# Patient Record
Sex: FEMALE | Race: BLACK OR AFRICAN AMERICAN | NOT HISPANIC OR LATINO | Employment: FULL TIME | URBAN - METROPOLITAN AREA
[De-identification: names, ages, dates, MRNs, and addresses within clinical notes are randomized per-mention and may not be internally consistent; named-entity substitution may affect disease eponyms.]

---

## 2023-10-18 ENCOUNTER — APPOINTMENT (OUTPATIENT)
Dept: PRIMARY CARE | Facility: CLINIC | Age: 33
End: 2023-10-18
Payer: COMMERCIAL

## 2024-02-01 ENCOUNTER — OFFICE VISIT (OUTPATIENT)
Dept: OBSTETRICS AND GYNECOLOGY | Facility: CLINIC | Age: 34
End: 2024-02-01
Payer: COMMERCIAL

## 2024-02-01 VITALS
SYSTOLIC BLOOD PRESSURE: 119 MMHG | HEART RATE: 79 BPM | WEIGHT: 163 LBS | DIASTOLIC BLOOD PRESSURE: 75 MMHG | HEIGHT: 68 IN | BODY MASS INDEX: 24.71 KG/M2

## 2024-02-01 DIAGNOSIS — N76.0 BACTERIAL VAGINOSIS: ICD-10-CM

## 2024-02-01 DIAGNOSIS — B96.89 BACTERIAL VAGINOSIS: ICD-10-CM

## 2024-02-01 DIAGNOSIS — L02.212 CUTANEOUS ABSCESS OF BACK EXCLUDING BUTTOCKS: ICD-10-CM

## 2024-02-01 DIAGNOSIS — Z30.431 ENCOUNTER FOR ROUTINE CHECKING OF INTRAUTERINE CONTRACEPTIVE DEVICE (IUD): ICD-10-CM

## 2024-02-01 DIAGNOSIS — Z01.419 WELL WOMAN EXAM: Primary | ICD-10-CM

## 2024-02-01 DIAGNOSIS — Z11.3 SCREENING FOR STD (SEXUALLY TRANSMITTED DISEASE): ICD-10-CM

## 2024-02-01 PROCEDURE — 88175 CYTOPATH C/V AUTO FLUID REDO: CPT

## 2024-02-01 PROCEDURE — 87661 TRICHOMONAS VAGINALIS AMPLIF: CPT

## 2024-02-01 PROCEDURE — 99385 PREV VISIT NEW AGE 18-39: CPT | Performed by: NURSE PRACTITIONER

## 2024-02-01 PROCEDURE — 1036F TOBACCO NON-USER: CPT | Performed by: NURSE PRACTITIONER

## 2024-02-01 PROCEDURE — 87205 SMEAR GRAM STAIN: CPT

## 2024-02-01 PROCEDURE — 87624 HPV HI-RISK TYP POOLED RSLT: CPT

## 2024-02-01 PROCEDURE — 87800 DETECT AGNT MULT DNA DIREC: CPT

## 2024-02-01 RX ORDER — ALBUTEROL SULFATE 90 UG/1
2 AEROSOL, METERED RESPIRATORY (INHALATION)
COMMUNITY
Start: 2022-12-06

## 2024-02-01 SDOH — ECONOMIC STABILITY: FOOD INSECURITY: WITHIN THE PAST 12 MONTHS, THE FOOD YOU BOUGHT JUST DIDN'T LAST AND YOU DIDN'T HAVE MONEY TO GET MORE.: NEVER TRUE

## 2024-02-01 SDOH — ECONOMIC STABILITY: FOOD INSECURITY: WITHIN THE PAST 12 MONTHS, YOU WORRIED THAT YOUR FOOD WOULD RUN OUT BEFORE YOU GOT MONEY TO BUY MORE.: NEVER TRUE

## 2024-02-01 SDOH — ECONOMIC STABILITY: TRANSPORTATION INSECURITY
IN THE PAST 12 MONTHS, HAS LACK OF TRANSPORTATION KEPT YOU FROM MEETINGS, WORK, OR FROM GETTING THINGS NEEDED FOR DAILY LIVING?: NO

## 2024-02-01 SDOH — ECONOMIC STABILITY: TRANSPORTATION INSECURITY
IN THE PAST 12 MONTHS, HAS THE LACK OF TRANSPORTATION KEPT YOU FROM MEDICAL APPOINTMENTS OR FROM GETTING MEDICATIONS?: NO

## 2024-02-01 ASSESSMENT — SOCIAL DETERMINANTS OF HEALTH (SDOH)
WITHIN THE LAST YEAR, HAVE YOU BEEN AFRAID OF YOUR PARTNER OR EX-PARTNER?: NO
WITHIN THE LAST YEAR, HAVE YOU BEEN KICKED, HIT, SLAPPED, OR OTHERWISE PHYSICALLY HURT BY YOUR PARTNER OR EX-PARTNER?: NO
WITHIN THE LAST YEAR, HAVE YOU BEEN HUMILIATED OR EMOTIONALLY ABUSED IN OTHER WAYS BY YOUR PARTNER OR EX-PARTNER?: NO
WITHIN THE LAST YEAR, HAVE TO BEEN RAPED OR FORCED TO HAVE ANY KIND OF SEXUAL ACTIVITY BY YOUR PARTNER OR EX-PARTNER?: NO

## 2024-02-01 ASSESSMENT — ENCOUNTER SYMPTOMS
ALLERGIC/IMMUNOLOGIC NEGATIVE: 1
SORE THROAT: 0
HEADACHES: 0
FEVER: 0
GASTROINTESTINAL NEGATIVE: 1
CARDIOVASCULAR NEGATIVE: 1
FLANK PAIN: 0
HEMATURIA: 0
CONSTITUTIONAL NEGATIVE: 1
FREQUENCY: 1
WOUND: 1
ENDOCRINE NEGATIVE: 1
MUSCULOSKELETAL NEGATIVE: 1
HEMATOLOGIC/LYMPHATIC NEGATIVE: 1
EYES NEGATIVE: 1
NEUROLOGICAL NEGATIVE: 1
PSYCHIATRIC NEGATIVE: 1
CONSTIPATION: 0
BACK PAIN: 0
CHILLS: 0
VOMITING: 0
DIARRHEA: 0
ANOREXIA: 0
DIFFICULTY URINATING: 1
RESPIRATORY NEGATIVE: 1
ABDOMINAL PAIN: 0
DYSURIA: 1
NAUSEA: 0

## 2024-02-01 ASSESSMENT — PATIENT HEALTH QUESTIONNAIRE - PHQ9
1. LITTLE INTEREST OR PLEASURE IN DOING THINGS: NOT AT ALL
2. FEELING DOWN, DEPRESSED OR HOPELESS: NOT AT ALL
SUM OF ALL RESPONSES TO PHQ9 QUESTIONS 1 & 2: 0

## 2024-02-01 ASSESSMENT — PAIN SCALES - GENERAL: PAINLEVEL: 0-NO PAIN

## 2024-02-01 NOTE — PROGRESS NOTES
Althea Vernon, APRN-CNP     Subjective   Ravi Low is a 33 y.o. female who presents for annual exam.   IsPatient ID: Ravi Low is a 33 y.o. female.   new to this practice here for annual exam.  She has an IUD in place as her birth control method that was placed after the birth of her last child.  It is going on year 6.  She is aware that it will be due to be removed after 8 years.    Patient has discomfort after urination, a fishy odor and slight itching.  Urinalysis in office today is unremarkable.    Patient has a lesion on her back along her spinal column that has not drained despite attempt at removing any discharge inside.  She would like a referral to dermatology.    Procedures   Past Medical History:   Diagnosis Date    Acute vaginitis     Bacterial vaginosis    Anogenital (venereal) warts 2016    Genital warts    Encounter for immunization 2018    Need for Tdap vaccination    Other specified health status 2016    No pertinent past medical history     Past Surgical History:   Procedure Laterality Date     SECTION, CLASSIC       Section     SECTION, LOW TRANSVERSE      WISDOM TOOTH EXTRACTION         OB History          2    Para   2    Term                AB        Living   2         SAB        IAB        Ectopic        Multiple        Live Births   2               Patient's last menstrual period was 2024.      Review of Systems   Constitutional: Negative.    HENT: Negative.     Eyes: Negative.    Respiratory: Negative.     Cardiovascular: Negative.    Gastrointestinal: Negative.    Endocrine: Negative.    Genitourinary:  Positive for difficulty urinating, frequency and vaginal discharge.   Musculoskeletal: Negative.    Skin:  Positive for wound.   Allergic/Immunologic: Negative.    Neurological: Negative.    Hematological: Negative.    Psychiatric/Behavioral: Negative.     All other systems reviewed and are negative.    Breast: No  "Complaints   Vaginal: No Complaints        Objective   /75 (BP Location: Left arm, Patient Position: Sitting, BP Cuff Size: Adult)   Pulse 79   Ht 1.727 m (5' 8\")   Wt 73.9 kg (163 lb)   LMP 01/25/2024   BMI 24.78 kg/m²   Physical Exam  Constitutional:       Appearance: Normal appearance.   Genitourinary:      Vulva and rectum normal.      Right Labia: No rash or tenderness.     Left Labia: No tenderness or rash.     Cervical motion tenderness present.      Uterus is fixed.   Cardiovascular:      Rate and Rhythm: Normal rate.   Pulmonary:      Effort: Pulmonary effort is normal.      Breath sounds: Normal breath sounds.   Musculoskeletal:         General: Normal range of motion.   Neurological:      General: No focal deficit present.      Mental Status: She is alert.   Skin:     General: Skin is warm and dry.      Findings: Lesion present.   Vitals and nursing note reviewed.     Lesion present midline upper back slightly tender no drainage noted            Assessment/Plan   Problem List Items Addressed This Visit    None  Visit Diagnoses         Codes    Well woman exam    -  Primary Z01.419    Relevant Orders    THINPREP PAP TEST    Encounter for routine checking of intrauterine contraceptive device (IUD)     Z30.431    Cutaneous abscess of back excluding buttocks     L02.212    Relevant Orders    Referral to Dermatology    Screening for STD (sexually transmitted disease)     Z11.3    Relevant Orders    HIV 1/2 Antigen/Antibody Screen with Reflex to Confirmation    Hepatitis B Surface Antigen    Hepatitis C Antibody    Syphilis Screen with Reflex    Vaginitis Gram Stain For Bacterial Vaginosis + Yeast          "

## 2024-02-02 LAB
CLUE CELLS VAG LPF-#/AREA: PRESENT /[LPF]
NUGENT SCORE: 9
YEAST VAG WET PREP-#/AREA: ABNORMAL

## 2024-02-02 RX ORDER — METRONIDAZOLE 500 MG/1
500 TABLET ORAL 2 TIMES DAILY
Qty: 14 TABLET | Refills: 0 | Status: SHIPPED | OUTPATIENT
Start: 2024-02-02 | End: 2024-02-09

## 2024-02-03 LAB
C TRACH RRNA SPEC QL NAA+PROBE: NEGATIVE
N GONORRHOEA DNA SPEC QL PROBE+SIG AMP: NEGATIVE
T VAGINALIS RRNA SPEC QL NAA+PROBE: NEGATIVE

## 2024-02-14 ENCOUNTER — OFFICE VISIT (OUTPATIENT)
Dept: DERMATOLOGY | Facility: CLINIC | Age: 34
End: 2024-02-14
Payer: COMMERCIAL

## 2024-02-14 DIAGNOSIS — L72.0 EPIDERMAL CYST: Primary | ICD-10-CM

## 2024-02-14 PROCEDURE — 99203 OFFICE O/P NEW LOW 30 MIN: CPT | Performed by: NURSE PRACTITIONER

## 2024-02-14 PROCEDURE — 1036F TOBACCO NON-USER: CPT | Performed by: NURSE PRACTITIONER

## 2024-02-14 NOTE — PROGRESS NOTES
Subjective     Ravi Low is a 33 y.o. female who presents for the following: Suspicious Skin Lesion (Pt presents for examination of lesion to back. She states it has been there for approximately one year and it is itchy and painful. Denies personal or family history of skin cancer. ).     Review of Systems:  No other skin or systemic complaints other than what is documented elsewhere in the note.    The following portions of the chart were reviewed this encounter and updated as appropriate:  Tobacco  Allergies  Meds  Problems  Med Hx  Surg Hx  Fam Hx         Skin Cancer History  No skin cancer on file.      Specialty Problems    None       Objective   Well appearing patient in no apparent distress; mood and affect are within normal limits.    A full examination was performed including scalp, head, eyes, ears, nose, lips, neck, chest, axillae, abdomen, back, buttocks, bilateral upper extremities, bilateral lower extremities, hands, feet, fingers, toes, fingernails, and toenails. All findings within normal limits unless otherwise noted below.    Assessment/Plan   1. Epidermal cyst  Mid Back  1.5cm subcutaneous nodule with normal-appearing overlying skin and connecting pore    -Discussed nature of the condition  -Reassurance, recommend observation at this time  - Due to pain, itch, will submit prior authorization for excision.

## 2024-02-15 LAB
CYTOLOGY CMNT CVX/VAG CYTO-IMP: NORMAL
HPV HR 12 DNA GENITAL QL NAA+PROBE: NEGATIVE
HPV HR GENOTYPES PNL CVX NAA+PROBE: NEGATIVE
HPV16 DNA SPEC QL NAA+PROBE: NEGATIVE
HPV18 DNA SPEC QL NAA+PROBE: NEGATIVE
LAB AP CONTRACEPTIVE HISTORY: NORMAL
LAB AP HPV GENOTYPE QUESTION: YES
LAB AP HPV HR: NORMAL
LAB AP PAP ADDITIONAL TESTS: NORMAL
LABORATORY COMMENT REPORT: NORMAL
LMP START DATE: NORMAL
PATH REPORT.TOTAL CANCER: NORMAL

## 2024-05-13 ENCOUNTER — PROCEDURE VISIT (OUTPATIENT)
Dept: DERMATOLOGY | Facility: CLINIC | Age: 34
End: 2024-05-13
Payer: COMMERCIAL

## 2024-05-13 DIAGNOSIS — D48.5 NEOPLASM OF UNCERTAIN BEHAVIOR OF SKIN: ICD-10-CM

## 2024-05-13 PROCEDURE — 12032 INTMD RPR S/A/T/EXT 2.6-7.5: CPT | Performed by: STUDENT IN AN ORGANIZED HEALTH CARE EDUCATION/TRAINING PROGRAM

## 2024-05-13 PROCEDURE — 11402 EXC TR-EXT B9+MARG 1.1-2 CM: CPT | Performed by: STUDENT IN AN ORGANIZED HEALTH CARE EDUCATION/TRAINING PROGRAM

## 2024-05-13 NOTE — PROGRESS NOTES
Excision Operative Note    Date of Surgery:  5/13/2024  Surgeon:  Ash Block DO  Office Location: DO [address]  [address]  [facility] [address]  Dept: [phone]  Dept Fax: [phone]  Referring Provider: Susan L Mayne, APRN-CNP  [address]  [place] [address]    Subjective   Ravi Low is a 33 y.o. female who presents for the following: Excision for neoplasm of uncertain behavior of skin.    According to the patient, the lesion has been present for approximately 6 months at the time of diagnosis.  The lesion is growing, itch and pain.  The lesion has not been treated previously.    The patient does not have a pacemaker / defibrillator.  The patient does not have a heart valve / joint replacement.    The patient is not on blood thinners.   The patient does not have a history of hepatitis B or C.  The patient does not have a history of HIV.  The patient does not have a history of immunosuppression (e.g. organ transplantation, malignancy, medications)    Is it okay to leave a phone message with results? Yes  Who else may we leave results with: (name, relationship) No    The following portions of the chart were reviewed this encounter and updated as appropriate:         Assessment/Plan   Pre-procedure:   Obtained informed consent: written from patient  The surgical site was identified and confirmed with the patient.     Intra-operative:   Audible time out called at : 0915 AM 05/13/24  by: Nena Abreu MA   Verified patient name, birthdate, site, specimen bottle label & requisition.    The planned procedure(s) was again reviewed with the patient. The risks of bleeding, infection, nerve damage and scarring were reviewed. The patient identity, surgical site, and planned procedure(s) were verified.       Neoplasm of uncertain behavior of skin  Mid Back    Skin excision    Lesion length (cm):  1.1  Lesion width (cm):  1.1  Margin per side (cm):   0  Total excision diameter (cm):  1.1  Informed consent: discussed and consent obtained    Timeout: patient name, date of birth, surgical site, and procedure verified    Procedure prep:  Patient prepped in sterile fashion  Anesthesia: the lesion was anesthetized in a standard fashion    Anesthetic:  1% lidocaine w/ epinephrine 1-100,000 local infiltration  Instrument used: #15 blade    Hemostasis achieved with: suture, pressure and electrodesiccation    Outcome: patient tolerated procedure well with no complications    Post-procedure details: sterile dressing applied and wound care instructions given    Dressing type: pressure dressing    Additional details:  The possible diagnoses were explained. Although the lesion is likely benign, the patient requests removal of the lesion because of the symptoms it is causing. Excision was discussed with the patient. The risks, benefits and potential adverse effects were reviewed. Discussion included but was not limited to the cure rate, relative cost, wound care requirements, activity restrictions, likely scar outcome and time to heal were reviewed. Alternative options including monitoring the lesion were discussed. The patient elected to proceed with excision.     Skin repair  Complexity:  Intermediate  Final length (cm):  3.7  Informed consent: discussed and consent obtained    Timeout: patient name, date of birth, surgical site, and procedure verified    Procedure prep:  Patient prepped in sterile fashion  Anesthesia: the lesion was anesthetized in a standard fashion    Reason for type of repair: reduce tension to allow closure and preserve normal anatomical and functional relationships    Undermining: edges undermined    Subcutaneous layers (deep stitches):   Suture size:  3-0  Suture type: Vicryl (polyglactin 910)    Stitches:  Buried vertical mattress  Fine/surface layer approximation (top stitches):   Suture size:  5-0  Suture type: Prolene (polypropylene)    Stitches:  simple running    Suture removal (days):  10  Hemostasis achieved with: suture, pressure and electrodesiccation  Outcome: patient tolerated procedure well with no complications    Post-procedure details: sterile dressing applied and wound care instructions given    Dressing type: pressure dressing      Specimen 1 - Dermatopathology- DERM LAB  Differential Diagnosis: NUB  Check Margins Yes/No?:  NO  Comments:    Dermpath Lab: Routine Histopathology (formalin-fixed tissue)      Intermediate Linear Repair:  Given the location and size of the defect, it was determined that an intermediate layered linear closure was required to restore normal anatomy and function. The repair is an intermediate closure as two layers of sutures were required. The defect was undermined extensively at the level of the subcutaneous plane. Standing cutaneous cones were removed using Burow's triangles. The wound edges were brought into close approximation with buried vertical mattress sutures. The remainder of the wound was then closed with epidermal top sutures.    The final repair measured 3.7 cm    Wound care was discussed, and the patient was given written post-operative wound care instructions.      The patient will follow up with Ash Block DO as needed for any post operative problems or concerns, and will follow up with their primary dermatologist as scheduled.

## 2024-05-15 LAB
LABORATORY COMMENT REPORT: NORMAL
PATH REPORT.FINAL DX SPEC: NORMAL
PATH REPORT.GROSS SPEC: NORMAL
PATH REPORT.MICROSCOPIC SPEC OTHER STN: NORMAL
PATH REPORT.RELEVANT HX SPEC: NORMAL
PATH REPORT.TOTAL CANCER: NORMAL

## 2024-05-22 ENCOUNTER — TELEPHONE (OUTPATIENT)
Dept: DERMATOLOGY | Facility: CLINIC | Age: 34
End: 2024-05-22
Payer: COMMERCIAL

## 2024-05-22 NOTE — TELEPHONE ENCOUNTER
Patient notified that lesion removed is a benign Dermatofibroma and no further treatment is needed.

## 2024-05-23 ENCOUNTER — OFFICE VISIT (OUTPATIENT)
Dept: DERMATOLOGY | Facility: CLINIC | Age: 34
End: 2024-05-23
Payer: COMMERCIAL

## 2024-05-23 DIAGNOSIS — Z51.89 VISIT FOR WOUND CHECK: ICD-10-CM

## 2024-05-23 PROCEDURE — 99024 POSTOP FOLLOW-UP VISIT: CPT | Performed by: DERMATOLOGY

## 2024-07-16 ENCOUNTER — HOSPITAL ENCOUNTER (EMERGENCY)
Facility: HOSPITAL | Age: 34
Discharge: HOME | End: 2024-07-16
Attending: EMERGENCY MEDICINE
Payer: COMMERCIAL

## 2024-07-16 ENCOUNTER — APPOINTMENT (OUTPATIENT)
Dept: RADIOLOGY | Facility: HOSPITAL | Age: 34
End: 2024-07-16
Payer: COMMERCIAL

## 2024-07-16 VITALS
HEART RATE: 89 BPM | HEIGHT: 68 IN | RESPIRATION RATE: 16 BRPM | TEMPERATURE: 98.6 F | SYSTOLIC BLOOD PRESSURE: 105 MMHG | WEIGHT: 162 LBS | DIASTOLIC BLOOD PRESSURE: 83 MMHG | BODY MASS INDEX: 24.55 KG/M2 | OXYGEN SATURATION: 95 %

## 2024-07-16 DIAGNOSIS — N20.0 NEPHROLITHIASIS: ICD-10-CM

## 2024-07-16 DIAGNOSIS — N76.0 BACTERIAL VAGINITIS: ICD-10-CM

## 2024-07-16 DIAGNOSIS — B96.89 BACTERIAL VAGINITIS: ICD-10-CM

## 2024-07-16 DIAGNOSIS — R10.84 ABDOMINAL PAIN, GENERALIZED: Primary | ICD-10-CM

## 2024-07-16 DIAGNOSIS — R11.2 NAUSEA AND VOMITING, UNSPECIFIED VOMITING TYPE: ICD-10-CM

## 2024-07-16 LAB
ALBUMIN SERPL BCP-MCNC: 3.8 G/DL (ref 3.4–5)
ALP SERPL-CCNC: 78 U/L (ref 33–110)
ALT SERPL W P-5'-P-CCNC: 17 U/L (ref 7–45)
ANION GAP SERPL CALC-SCNC: 10 MMOL/L (ref 10–20)
APPEARANCE UR: ABNORMAL
AST SERPL W P-5'-P-CCNC: 19 U/L (ref 9–39)
BASOPHILS # BLD AUTO: 0.02 X10*3/UL (ref 0–0.1)
BASOPHILS NFR BLD AUTO: 0.2 %
BILIRUB SERPL-MCNC: 0.4 MG/DL (ref 0–1.2)
BILIRUB UR STRIP.AUTO-MCNC: NEGATIVE MG/DL
BUN SERPL-MCNC: 8 MG/DL (ref 6–23)
CALCIUM SERPL-MCNC: 8.9 MG/DL (ref 8.6–10.3)
CHLORIDE SERPL-SCNC: 105 MMOL/L (ref 98–107)
CLUE CELLS SPEC QL WET PREP: PRESENT
CO2 SERPL-SCNC: 25 MMOL/L (ref 21–32)
COLOR UR: ABNORMAL
CREAT SERPL-MCNC: 0.65 MG/DL (ref 0.5–1.05)
EGFRCR SERPLBLD CKD-EPI 2021: >90 ML/MIN/1.73M*2
EOSINOPHIL # BLD AUTO: 0.2 X10*3/UL (ref 0–0.7)
EOSINOPHIL NFR BLD AUTO: 2.3 %
ERYTHROCYTE [DISTWIDTH] IN BLOOD BY AUTOMATED COUNT: 14.8 % (ref 11.5–14.5)
GLUCOSE SERPL-MCNC: 92 MG/DL (ref 74–99)
GLUCOSE UR STRIP.AUTO-MCNC: NORMAL MG/DL
HCG UR QL IA.RAPID: NEGATIVE
HCT VFR BLD AUTO: 35.1 % (ref 36–46)
HGB BLD-MCNC: 11.5 G/DL (ref 12–16)
HIV 1+2 AB+HIV1 P24 AG SERPL QL IA: NONREACTIVE
IMM GRANULOCYTES # BLD AUTO: 0.02 X10*3/UL (ref 0–0.7)
IMM GRANULOCYTES NFR BLD AUTO: 0.2 % (ref 0–0.9)
KETONES UR STRIP.AUTO-MCNC: NEGATIVE MG/DL
LACTATE SERPL-SCNC: 0.5 MMOL/L (ref 0.4–2)
LEUKOCYTE ESTERASE UR QL STRIP.AUTO: NEGATIVE
LYMPHOCYTES # BLD AUTO: 3.02 X10*3/UL (ref 1.2–4.8)
LYMPHOCYTES NFR BLD AUTO: 34.7 %
MAGNESIUM SERPL-MCNC: 1.7 MG/DL (ref 1.6–2.4)
MCH RBC QN AUTO: 26.7 PG (ref 26–34)
MCHC RBC AUTO-ENTMCNC: 32.8 G/DL (ref 32–36)
MCV RBC AUTO: 81 FL (ref 80–100)
MONOCYTES # BLD AUTO: 0.33 X10*3/UL (ref 0.1–1)
MONOCYTES NFR BLD AUTO: 3.8 %
NEUTROPHILS # BLD AUTO: 5.12 X10*3/UL (ref 1.2–7.7)
NEUTROPHILS NFR BLD AUTO: 58.8 %
NITRITE UR QL STRIP.AUTO: NEGATIVE
NRBC BLD-RTO: 0 /100 WBCS (ref 0–0)
PH UR STRIP.AUTO: 6.5 [PH]
PLATELET # BLD AUTO: 391 X10*3/UL (ref 150–450)
POTASSIUM SERPL-SCNC: 3.5 MMOL/L (ref 3.5–5.3)
PROT SERPL-MCNC: 7.5 G/DL (ref 6.4–8.2)
PROT UR STRIP.AUTO-MCNC: NEGATIVE MG/DL
RBC # BLD AUTO: 4.31 X10*6/UL (ref 4–5.2)
RBC # UR STRIP.AUTO: ABNORMAL /UL
RBC #/AREA URNS AUTO: NORMAL /HPF
SODIUM SERPL-SCNC: 136 MMOL/L (ref 136–145)
SP GR UR STRIP.AUTO: 1.01
SQUAMOUS #/AREA URNS AUTO: NORMAL /HPF
T VAGINALIS SPEC QL WET PREP: ABNORMAL
TRICHOMONAS REFLEX COMMENT: ABNORMAL
UROBILINOGEN UR STRIP.AUTO-MCNC: NORMAL MG/DL
WBC # BLD AUTO: 8.7 X10*3/UL (ref 4.4–11.3)
WBC #/AREA URNS AUTO: NORMAL /HPF
WBC VAG QL WET PREP: ABNORMAL
YEAST VAG QL WET PREP: ABNORMAL

## 2024-07-16 PROCEDURE — 85025 COMPLETE CBC W/AUTO DIFF WBC: CPT

## 2024-07-16 PROCEDURE — 81001 URINALYSIS AUTO W/SCOPE: CPT

## 2024-07-16 PROCEDURE — 76830 TRANSVAGINAL US NON-OB: CPT

## 2024-07-16 PROCEDURE — 74177 CT ABD & PELVIS W/CONTRAST: CPT | Performed by: RADIOLOGY

## 2024-07-16 PROCEDURE — 96361 HYDRATE IV INFUSION ADD-ON: CPT

## 2024-07-16 PROCEDURE — 99285 EMERGENCY DEPT VISIT HI MDM: CPT | Mod: 25

## 2024-07-16 PROCEDURE — 36415 COLL VENOUS BLD VENIPUNCTURE: CPT

## 2024-07-16 PROCEDURE — 87205 SMEAR GRAM STAIN: CPT | Mod: AHULAB

## 2024-07-16 PROCEDURE — 2550000001 HC RX 255 CONTRASTS: Performed by: EMERGENCY MEDICINE

## 2024-07-16 PROCEDURE — 2500000004 HC RX 250 GENERAL PHARMACY W/ HCPCS (ALT 636 FOR OP/ED)

## 2024-07-16 PROCEDURE — 96376 TX/PRO/DX INJ SAME DRUG ADON: CPT

## 2024-07-16 PROCEDURE — 87210 SMEAR WET MOUNT SALINE/INK: CPT

## 2024-07-16 PROCEDURE — 87491 CHLMYD TRACH DNA AMP PROBE: CPT | Mod: AHULAB

## 2024-07-16 PROCEDURE — 81025 URINE PREGNANCY TEST: CPT | Performed by: EMERGENCY MEDICINE

## 2024-07-16 PROCEDURE — 84075 ASSAY ALKALINE PHOSPHATASE: CPT

## 2024-07-16 PROCEDURE — 83735 ASSAY OF MAGNESIUM: CPT

## 2024-07-16 PROCEDURE — 96375 TX/PRO/DX INJ NEW DRUG ADDON: CPT

## 2024-07-16 PROCEDURE — 2500000001 HC RX 250 WO HCPCS SELF ADMINISTERED DRUGS (ALT 637 FOR MEDICARE OP)

## 2024-07-16 PROCEDURE — 83605 ASSAY OF LACTIC ACID: CPT

## 2024-07-16 PROCEDURE — 93975 VASCULAR STUDY: CPT

## 2024-07-16 PROCEDURE — 87389 HIV-1 AG W/HIV-1&-2 AB AG IA: CPT | Mod: AHULAB

## 2024-07-16 PROCEDURE — 87661 TRICHOMONAS VAGINALIS AMPLIF: CPT | Mod: AHULAB

## 2024-07-16 PROCEDURE — 96374 THER/PROPH/DIAG INJ IV PUSH: CPT | Mod: 59

## 2024-07-16 PROCEDURE — 2500000002 HC RX 250 W HCPCS SELF ADMINISTERED DRUGS (ALT 637 FOR MEDICARE OP, ALT 636 FOR OP/ED)

## 2024-07-16 PROCEDURE — 74177 CT ABD & PELVIS W/CONTRAST: CPT

## 2024-07-16 RX ORDER — NAPROXEN 500 MG/1
500 TABLET ORAL
Qty: 30 TABLET | Refills: 0 | Status: SHIPPED | OUTPATIENT
Start: 2024-07-16 | End: 2024-07-31

## 2024-07-16 RX ORDER — ONDANSETRON HYDROCHLORIDE 2 MG/ML
4 INJECTION, SOLUTION INTRAVENOUS ONCE
Status: COMPLETED | OUTPATIENT
Start: 2024-07-16 | End: 2024-07-16

## 2024-07-16 RX ORDER — ACETAMINOPHEN 500 MG
1000 TABLET ORAL EVERY 6 HOURS PRN
Qty: 30 TABLET | Refills: 0 | Status: SHIPPED | OUTPATIENT
Start: 2024-07-16 | End: 2024-07-26

## 2024-07-16 RX ORDER — OXYCODONE HYDROCHLORIDE 5 MG/1
5 TABLET ORAL ONCE
Status: COMPLETED | OUTPATIENT
Start: 2024-07-16 | End: 2024-07-16

## 2024-07-16 RX ORDER — ONDANSETRON 4 MG/1
4 TABLET, FILM COATED ORAL EVERY 6 HOURS
Qty: 12 TABLET | Refills: 0 | Status: SHIPPED | OUTPATIENT
Start: 2024-07-16 | End: 2024-07-19

## 2024-07-16 RX ORDER — TAMSULOSIN HYDROCHLORIDE 0.4 MG/1
0.4 CAPSULE ORAL DAILY
Qty: 3 CAPSULE | Refills: 0 | Status: SHIPPED | OUTPATIENT
Start: 2024-07-16

## 2024-07-16 RX ORDER — TAMSULOSIN HYDROCHLORIDE 0.4 MG/1
0.4 CAPSULE ORAL ONCE
Status: COMPLETED | OUTPATIENT
Start: 2024-07-16 | End: 2024-07-16

## 2024-07-16 RX ORDER — METRONIDAZOLE 500 MG/1
500 TABLET ORAL ONCE
Status: COMPLETED | OUTPATIENT
Start: 2024-07-16 | End: 2024-07-16

## 2024-07-16 RX ORDER — KETOROLAC TROMETHAMINE 30 MG/ML
15 INJECTION, SOLUTION INTRAMUSCULAR; INTRAVENOUS ONCE
Status: COMPLETED | OUTPATIENT
Start: 2024-07-16 | End: 2024-07-16

## 2024-07-16 RX ORDER — MORPHINE SULFATE 2 MG/ML
2 INJECTION, SOLUTION INTRAMUSCULAR; INTRAVENOUS ONCE
Status: COMPLETED | OUTPATIENT
Start: 2024-07-16 | End: 2024-07-16

## 2024-07-16 RX ORDER — ACETAMINOPHEN 325 MG/1
975 TABLET ORAL ONCE
Status: COMPLETED | OUTPATIENT
Start: 2024-07-16 | End: 2024-07-16

## 2024-07-16 RX ORDER — METRONIDAZOLE 500 MG/1
500 TABLET ORAL 3 TIMES DAILY
Qty: 30 TABLET | Refills: 0 | Status: SHIPPED | OUTPATIENT
Start: 2024-07-16 | End: 2024-07-26

## 2024-07-16 ASSESSMENT — PAIN SCALES - GENERAL
PAINLEVEL_OUTOF10: 3
PAINLEVEL_OUTOF10: 2
PAINLEVEL_OUTOF10: 9
PAINLEVEL_OUTOF10: 9

## 2024-07-16 ASSESSMENT — PAIN - FUNCTIONAL ASSESSMENT
PAIN_FUNCTIONAL_ASSESSMENT: 0-10
PAIN_FUNCTIONAL_ASSESSMENT: 0-10

## 2024-07-16 ASSESSMENT — COLUMBIA-SUICIDE SEVERITY RATING SCALE - C-SSRS
1. IN THE PAST MONTH, HAVE YOU WISHED YOU WERE DEAD OR WISHED YOU COULD GO TO SLEEP AND NOT WAKE UP?: NO
6. HAVE YOU EVER DONE ANYTHING, STARTED TO DO ANYTHING, OR PREPARED TO DO ANYTHING TO END YOUR LIFE?: NO
2. HAVE YOU ACTUALLY HAD ANY THOUGHTS OF KILLING YOURSELF?: NO

## 2024-07-16 ASSESSMENT — PAIN DESCRIPTION - DESCRIPTORS: DESCRIPTORS: BURNING

## 2024-07-16 ASSESSMENT — PAIN DESCRIPTION - LOCATION: LOCATION: ABDOMEN

## 2024-07-16 NOTE — DISCHARGE INSTRUCTIONS
Please return to the emergency department, should you have any worsening symptoms, are unable to get an appointment with your primary care physician within the discussed time frame, or have any further concerns.     Please follow up with:   Primary care as needed.  Please follow-up with urology in the next few days.    You may take ibuprofen or tylenol for pain. You may alternate ibuprofen and tylenol every 6 hours for better pain control.    Do not exceed 2400mg of ibuprofen or 4000mg of tylenol in a 24 hour period.

## 2024-07-16 NOTE — ED PROVIDER NOTES
Emergency Department Provider Note        History of Present Illness     History provided by: Patient  Limitations to History: None  External Records Reviewed with Brief Summary: Outpatient progress note from 7/15/2024 from [facility] which showed that she was treated for UTI, patient was able to show me her results, urinalysis was positive for blood, and bacteria, however negative for leukocytes or leukocyte esterase.    HPI:  Ravi Low is a 33 y.o. female with prior history of frequent UTIs over the last few months presented to the emergency department with persistent hematuria and overall malaise with nausea, and vomiting.  Patient states that she has been treated multiple times for UTIs, however they seem to recur shortly after completing her antibiotics.  Patient was recently seen at [facility] yesterday for hematuria, and had a urinalysis performed which was significant for blood and bacteria, however did not show any white blood cells or leukocyte esterase.  Since then she has noted nausea, vomiting, and diffuse abdominal pain.  She declines any vaginal bleeding or vaginal discharge, notes that she is on Mirena so does she does not have regular cycles.  However point-of-care pregnancy test performed at outside hospital yesterday and was noted to be negative.      Physical Exam   Triage vitals:  T 36.3 °C (97.3 °F)  HR (!) 102  /83  RR 17  O2 95 %      GEN:  A&Ox3, in mild acute pain distress, appears uncomfortable. Conversational and appropriate.    HEENT: Normocephalic, atraumatic. Conjunctiva pink with no redness or exudates. Hearing grossly intact. Moist mucous membranes.  CARDIO: Tachycardic rate and regular rhythm. Normal S1, S2  without murmurs, rubs, or gallops.   PULM: Clear to auscultation bilaterally. No rales, rhonchi, or wheezes. No accessory muscle use or stridor.  GI: Soft, diffusely tender abdomen, non-distended. No rebound tenderness or guarding.  Active nausea on physical exam.  SKIN: Warm and  dry, no rashes, lesions, petechiae, or purpura.  MSK: ROM intact in all 4 extremities without contractures or pain. No peripheral edema, contusions, or wounds.    NEURO: No focal findings identified. No confusion or gross mental status changes.    Medical Decision Making & ED Course   Medical Decision Makin y.o. female with prior medical history of frequent UTIs presenting to the emergency department with nausea, vomiting, and diffuse abdominal pain, found to have nephrolithiasis.  Patient still had blood in her urine, which supports this, in addition to CT abdomen/pelvis findings.  Low suspicion for tubo-ovarian abscess or torsion given negative ultrasound of the pelvis.  Normal lactate and normal white blood cell count, thus low suspicion for bacteremia.  However given patient's duration of symptoms, a pelvic was performed to rule out additional STI concerns, and STD testing was sent.  Notable positive test for BV, was given Flagyl in the ED and was able to tolerate appropriately.  Symptoms improved with Zofran, Toradol, and morphine.  Discussed these findings with her, she was agreeable to discharge home and follow-up with urology.  She was given prescriptions for Zofran, naproxen, Tylenol, Flagyl and Flomax.  She was discharged in stable condition.  ----      Differential diagnoses considered include but are not limited to: Nephrolithiasis, pyelonephritis, septic nephrolithiasis, pregnancy+/- ectopic, cholecystitis, pancreatitis, tubo-ovarian abscess, ovarian torsion     Social Determinants of Health which Significantly Impact Care: None identified     EKG Independent Interpretation: EKG not obtained    Independent Result Review and Interpretation: Relevant laboratory and radiographic results were reviewed and independently interpreted by myself.  As necessary, they are commented on in the ED Course.    Chronic conditions affecting the patient's care: As documented above in MDM    The patient was discussed  with the following consultants/services: None    Care Considerations: As documented above in MDM    ED Course:  ED Course as of 07/16/24 1957 Tue Jul 16, 2024   1105 HEMOGLOBIN(!): 11.5  Baseline [AD]   1105 Appearance, Urine(!): Turbid [AD]   1105 Blood, Urine(!): 0.1 (1+) [AD]   1105 HCG, Urine: NEGATIVE [AD]   1105 Lactate: 0.5 [AD]   1200 US pelvis transvaginal  No TOA, or torsion [AD]   1230 CT abdomen pelvis w IV contrast  4mm calcified area near the bladder junction, likely a kidney stone [AD]      ED Course User Index  [AD] Dayron Driscoll DO         Diagnoses as of 07/16/24 1957   Abdominal pain, generalized   Nausea and vomiting, unspecified vomiting type   Nephrolithiasis   Bacterial vaginitis     Disposition   As a result of the work-up, the patient was discharged home.  she was informed of her diagnosis and instructed to come back with any concerns or worsening of condition.  she and was agreeable to the plan as discussed above.  she was given the opportunity to ask questions.  All of the patient's questions were answered.    Procedures   Procedures    Patient seen and discussed with ED attending physician.    Dayron Driscoll DO  Emergency Medicine       Dayron Driscoll DO  Resident  07/16/24 1957

## 2024-07-16 NOTE — ED TRIAGE NOTES
PT is A/Ox4 coming in for dizziness/ABD pain. Pt stated that symptoms started 2 days ago and she was in the ED yesterday and was treated for a UTI. PT stated that her AB D pain is getting worse and today she is dizzy with a headachy. While at work the PT was seeing spots. PT is also experiencing slight shortness of breath.

## 2024-07-16 NOTE — LETTER
July 16, 2024    Patient: Ravi Low   YOB: 1990   Date of Visit: 7/16/2024       To Whom It May Concern:    Ravi Low was seen and treated in our emergency department on 7/16/2024. She nay return to work 7/19/2024    If you have any questions or concerns, please don't hesitate to call.              CC: No Recipients

## 2024-07-17 LAB
BACTERIAL VAGINOSIS VAG-IMP: ABNORMAL
C TRACH RRNA SPEC QL NAA+PROBE: NEGATIVE
CLUE CELLS VAG LPF-#/AREA: PRESENT /[LPF]
N GONORRHOEA DNA SPEC QL PROBE+SIG AMP: NEGATIVE
NUGENT SCORE: 10
T VAGINALIS RRNA SPEC QL NAA+PROBE: NEGATIVE
YEAST VAG WET PREP-#/AREA: ABNORMAL

## 2024-07-22 ENCOUNTER — HOSPITAL ENCOUNTER (EMERGENCY)
Facility: HOSPITAL | Age: 34
Discharge: HOME | End: 2024-07-22
Attending: EMERGENCY MEDICINE
Payer: COMMERCIAL

## 2024-07-22 VITALS
BODY MASS INDEX: 24.55 KG/M2 | TEMPERATURE: 98.3 F | HEIGHT: 68 IN | WEIGHT: 162 LBS | SYSTOLIC BLOOD PRESSURE: 114 MMHG | DIASTOLIC BLOOD PRESSURE: 76 MMHG | HEART RATE: 72 BPM | OXYGEN SATURATION: 100 % | RESPIRATION RATE: 18 BRPM

## 2024-07-22 DIAGNOSIS — R10.2 PELVIC PAIN: ICD-10-CM

## 2024-07-22 DIAGNOSIS — T83.32XA MALPOSITIONED INTRAUTERINE DEVICE (IUD), INITIAL ENCOUNTER: Primary | ICD-10-CM

## 2024-07-22 DIAGNOSIS — N39.0 UTI (URINARY TRACT INFECTION) WITH PYURIA: ICD-10-CM

## 2024-07-22 LAB
ALBUMIN SERPL BCP-MCNC: 3.8 G/DL (ref 3.4–5)
ALP SERPL-CCNC: 67 U/L (ref 33–110)
ALT SERPL W P-5'-P-CCNC: 40 U/L (ref 7–45)
ANION GAP SERPL CALC-SCNC: 11 MMOL/L (ref 10–20)
APPEARANCE UR: CLEAR
AST SERPL W P-5'-P-CCNC: 57 U/L (ref 9–39)
BACTERIA #/AREA URNS AUTO: ABNORMAL /HPF
BASOPHILS # BLD AUTO: 0.03 X10*3/UL (ref 0–0.1)
BASOPHILS NFR BLD AUTO: 0.3 %
BILIRUB SERPL-MCNC: 0.3 MG/DL (ref 0–1.2)
BILIRUB UR STRIP.AUTO-MCNC: NEGATIVE MG/DL
BUN SERPL-MCNC: 10 MG/DL (ref 6–23)
CALCIUM SERPL-MCNC: 8.6 MG/DL (ref 8.6–10.3)
CHLORIDE SERPL-SCNC: 104 MMOL/L (ref 98–107)
CO2 SERPL-SCNC: 23 MMOL/L (ref 21–32)
COLOR UR: COLORLESS
CREAT SERPL-MCNC: 0.61 MG/DL (ref 0.5–1.05)
EGFRCR SERPLBLD CKD-EPI 2021: >90 ML/MIN/1.73M*2
EOSINOPHIL # BLD AUTO: 0.25 X10*3/UL (ref 0–0.7)
EOSINOPHIL NFR BLD AUTO: 2.7 %
ERYTHROCYTE [DISTWIDTH] IN BLOOD BY AUTOMATED COUNT: 14.7 % (ref 11.5–14.5)
GLUCOSE SERPL-MCNC: 81 MG/DL (ref 74–99)
GLUCOSE UR STRIP.AUTO-MCNC: NORMAL MG/DL
HCG UR QL IA.RAPID: NEGATIVE
HCT VFR BLD AUTO: 33.3 % (ref 36–46)
HGB BLD-MCNC: 10.7 G/DL (ref 12–16)
IMM GRANULOCYTES # BLD AUTO: 0.04 X10*3/UL (ref 0–0.7)
IMM GRANULOCYTES NFR BLD AUTO: 0.4 % (ref 0–0.9)
KETONES UR STRIP.AUTO-MCNC: NEGATIVE MG/DL
LACTATE SERPL-SCNC: 0.5 MMOL/L (ref 0.4–2)
LEUKOCYTE ESTERASE UR QL STRIP.AUTO: ABNORMAL
LIPASE SERPL-CCNC: 27 U/L (ref 9–82)
LYMPHOCYTES # BLD AUTO: 3.49 X10*3/UL (ref 1.2–4.8)
LYMPHOCYTES NFR BLD AUTO: 38.3 %
MCH RBC QN AUTO: 27 PG (ref 26–34)
MCHC RBC AUTO-ENTMCNC: 32.1 G/DL (ref 32–36)
MCV RBC AUTO: 84 FL (ref 80–100)
MONOCYTES # BLD AUTO: 0.38 X10*3/UL (ref 0.1–1)
MONOCYTES NFR BLD AUTO: 4.2 %
NEUTROPHILS # BLD AUTO: 4.93 X10*3/UL (ref 1.2–7.7)
NEUTROPHILS NFR BLD AUTO: 54.1 %
NITRITE UR QL STRIP.AUTO: NEGATIVE
NRBC BLD-RTO: 0 /100 WBCS (ref 0–0)
PH UR STRIP.AUTO: 6.5 [PH]
PLATELET # BLD AUTO: 380 X10*3/UL (ref 150–450)
POTASSIUM SERPL-SCNC: 4 MMOL/L (ref 3.5–5.3)
PROT SERPL-MCNC: 7.3 G/DL (ref 6.4–8.2)
PROT UR STRIP.AUTO-MCNC: NEGATIVE MG/DL
RBC # BLD AUTO: 3.96 X10*6/UL (ref 4–5.2)
RBC # UR STRIP.AUTO: NEGATIVE /UL
RBC #/AREA URNS AUTO: ABNORMAL /HPF
SODIUM SERPL-SCNC: 134 MMOL/L (ref 136–145)
SP GR UR STRIP.AUTO: 1.01
SQUAMOUS #/AREA URNS AUTO: ABNORMAL /HPF
UROBILINOGEN UR STRIP.AUTO-MCNC: NORMAL MG/DL
WBC # BLD AUTO: 9.1 X10*3/UL (ref 4.4–11.3)
WBC #/AREA URNS AUTO: ABNORMAL /HPF

## 2024-07-22 PROCEDURE — 85025 COMPLETE CBC W/AUTO DIFF WBC: CPT | Performed by: EMERGENCY MEDICINE

## 2024-07-22 PROCEDURE — 80053 COMPREHEN METABOLIC PANEL: CPT | Performed by: EMERGENCY MEDICINE

## 2024-07-22 PROCEDURE — 96375 TX/PRO/DX INJ NEW DRUG ADDON: CPT

## 2024-07-22 PROCEDURE — 81001 URINALYSIS AUTO W/SCOPE: CPT | Performed by: EMERGENCY MEDICINE

## 2024-07-22 PROCEDURE — 99284 EMERGENCY DEPT VISIT MOD MDM: CPT

## 2024-07-22 PROCEDURE — 99253 IP/OBS CNSLTJ NEW/EST LOW 45: CPT | Performed by: STUDENT IN AN ORGANIZED HEALTH CARE EDUCATION/TRAINING PROGRAM

## 2024-07-22 PROCEDURE — 83605 ASSAY OF LACTIC ACID: CPT | Performed by: EMERGENCY MEDICINE

## 2024-07-22 PROCEDURE — 36415 COLL VENOUS BLD VENIPUNCTURE: CPT | Performed by: EMERGENCY MEDICINE

## 2024-07-22 PROCEDURE — 96374 THER/PROPH/DIAG INJ IV PUSH: CPT

## 2024-07-22 PROCEDURE — 2500000004 HC RX 250 GENERAL PHARMACY W/ HCPCS (ALT 636 FOR OP/ED): Performed by: EMERGENCY MEDICINE

## 2024-07-22 PROCEDURE — 83690 ASSAY OF LIPASE: CPT | Performed by: EMERGENCY MEDICINE

## 2024-07-22 PROCEDURE — 81025 URINE PREGNANCY TEST: CPT | Performed by: EMERGENCY MEDICINE

## 2024-07-22 PROCEDURE — 87086 URINE CULTURE/COLONY COUNT: CPT | Mod: AHULAB | Performed by: EMERGENCY MEDICINE

## 2024-07-22 RX ORDER — ACETAMINOPHEN 500 MG
1000 TABLET ORAL EVERY 6 HOURS PRN
Qty: 30 TABLET | Refills: 0 | Status: SHIPPED | OUTPATIENT
Start: 2024-07-22 | End: 2024-08-01

## 2024-07-22 RX ORDER — KETOROLAC TROMETHAMINE 30 MG/ML
15 INJECTION, SOLUTION INTRAMUSCULAR; INTRAVENOUS ONCE
Status: COMPLETED | OUTPATIENT
Start: 2024-07-22 | End: 2024-07-22

## 2024-07-22 RX ORDER — CEPHALEXIN 500 MG/1
500 CAPSULE ORAL 4 TIMES DAILY
Qty: 28 CAPSULE | Refills: 0 | Status: SHIPPED | OUTPATIENT
Start: 2024-07-22 | End: 2024-07-22

## 2024-07-22 RX ORDER — CEPHALEXIN 500 MG/1
500 CAPSULE ORAL 4 TIMES DAILY
Qty: 40 CAPSULE | Refills: 0 | Status: SHIPPED | OUTPATIENT
Start: 2024-07-22 | End: 2024-08-01

## 2024-07-22 RX ORDER — MORPHINE SULFATE 2 MG/ML
2 INJECTION, SOLUTION INTRAMUSCULAR; INTRAVENOUS ONCE
Status: COMPLETED | OUTPATIENT
Start: 2024-07-22 | End: 2024-07-22

## 2024-07-22 RX ORDER — IBUPROFEN 600 MG/1
600 TABLET ORAL EVERY 6 HOURS PRN
Qty: 30 TABLET | Refills: 0 | Status: SHIPPED | OUTPATIENT
Start: 2024-07-22 | End: 2024-07-29

## 2024-07-22 ASSESSMENT — PAIN - FUNCTIONAL ASSESSMENT
PAIN_FUNCTIONAL_ASSESSMENT: 0-10
PAIN_FUNCTIONAL_ASSESSMENT: 0-10

## 2024-07-22 ASSESSMENT — PAIN SCALES - GENERAL
PAINLEVEL_OUTOF10: 10 - WORST POSSIBLE PAIN

## 2024-07-22 ASSESSMENT — PAIN DESCRIPTION - PROGRESSION: CLINICAL_PROGRESSION: NOT CHANGED

## 2024-07-22 ASSESSMENT — COLUMBIA-SUICIDE SEVERITY RATING SCALE - C-SSRS
6. HAVE YOU EVER DONE ANYTHING, STARTED TO DO ANYTHING, OR PREPARED TO DO ANYTHING TO END YOUR LIFE?: NO
1. IN THE PAST MONTH, HAVE YOU WISHED YOU WERE DEAD OR WISHED YOU COULD GO TO SLEEP AND NOT WAKE UP?: NO
2. HAVE YOU ACTUALLY HAD ANY THOUGHTS OF KILLING YOURSELF?: NO

## 2024-07-22 ASSESSMENT — PAIN DESCRIPTION - PAIN TYPE: TYPE: ACUTE PAIN

## 2024-07-22 ASSESSMENT — PAIN DESCRIPTION - LOCATION
LOCATION: BACK
LOCATION: ABDOMEN

## 2024-07-22 NOTE — LETTER
July 22, 2024    Patient: Ravi Low   YOB: 1990   Date of Visit: 7/22/2024       To Whom It May Concern:    Ravi Low was seen and treated in our emergency department on 7/22/2024. She may to return to work on 07/25/2024    If you have any questions or concerns, please don't hesitate to call.              CC: No Recipients

## 2024-07-22 NOTE — DISCHARGE INSTRUCTIONS
Take all antibiotics until gone.  Do not share your medication with anyone.    UTI Instructions: Return to the ED if you are unable to take your antibiotics, if you have vomiting, or if you have a fever.

## 2024-07-22 NOTE — ED PROVIDER NOTES
HPI   Chief Complaint   Patient presents with    Flank Pain       HPI  Patient is a 33-year-old female with a past medical history significant for frequent UTIs who presented to the emergency room with a chief complaint of pelvic and right flank pain.  She has been experiencing this a lot recently.  She was seen at [facility] where she was diagnosed with hematuria and had blood and bacteria in the urine.  She has no vaginal bleeding or discharge and was seen here the other day where she was diagnosed with bacterial vaginosis, possible left nephrolithiasis.  She presents with ongoing symptoms particularly in the pelvis bilaterally.  She has pain somewhat on the right flank which is contralateral to where they found kidney stone.  She had some nausea and vomiting that she feels now is streaked with blood.  No diarrhea.  Had a normal bowel movement earlier today without melena or hematochezia.  She received Zofran by EMS.  She has yet to follow-up as an outpatient with gynecology.      PMHx: As above  PSHx:   FamilyHx: Denies  SocialHx: Denies  Allergies: NKDA  Medications: See Medication Reconciliation     ROS  As above otherwise denies      Physical Exam    GENERAL: Awake and Alert, No Acute Distress but appears uncomfortable  HEENT: AT/NC, PERRL, EOMI, Normal Oropharynx, No Signs of Dehydration  NECK: Normal Inspection, No JVD  CARDIOVASCULAR: RRR, No M/R/G  RESPIRATORY: CTA Bilaterally, No Wheezes, Rales or Rhonchi, Chest Wall Non-tender  ABDOMEN: Bilateral pelvic pain but otherwise soft, non-tender abdomen, Normal Bowel Sounds, No Distention  BACK: No CVA Tenderness  SKIN: Normal Color, Warm, Dry, No Rashes   EXTREMITIES: Non-Tender, Full ROM, No Pedal Edema  NEURO: A&O x 3, Normal Motor and Sensation, Normal Mood and Affect    Nursing Assessment and Vitals Reviewed    Medical Decision  Patient is a 33-year-old female with a past medical history significant for frequent UTIs who presented to the emergency room  with a chief complaint of pelvic and right flank pain.  She has been experiencing this a lot recently.  She was seen at [facility] where she was diagnosed with hematuria and had blood and bacteria in the urine.  She has no vaginal bleeding or discharge and was seen here the other day where she was diagnosed with bacterial vaginosis, possible left nephrolithiasis.  She presents with ongoing symptoms particularly in the pelvis bilaterally.  She has pain somewhat on the right flank which is contralateral to where they found kidney stone.  She had some nausea and vomiting that she feels now is streaked with blood.  No diarrhea.  Had a normal bowel movement earlier today without melena or hematochezia.  She received Zofran by EMS.  She has yet to follow-up as an outpatient with gynecology.    On evaluation patient is seen in the hallway owing to large patient volumes.  On exam lungs are clear and heart is regular.  She does not have any CVA tenderness.  She is mildly uncomfortable in bilateral lower quadrant palpation.  Her abdomen is otherwise benign without peritoneal signs.  Since this is the same pain patient was experiencing the other day when she had CT and ultrasound imaging we will hold off on repeating imaging pending lab work.  Patient is given Toradol for pain.    Review of records show that patient had an ultrasound on 16 July.  At that time patient had an IUD that appeared to be abnormally situated possibly penetrating into the uterine wall.  CT abdomen and pelvis was added which showed a calcification measuring 4 mm in the left pelvis for which a vascular calcification is favored with ureteral calculus considered without ureteral dilatation, hydronephrosis or perinephric stranding.  Bladder findings concerning for cystitis.    Workup for patient today included labs that revealed no emergent electrolyte imbalance.  AST is slightly elevated patient has no pain in the right upper quadrant.  Mckeon sign is negative.   She does have a hemoglobin of 10.7 slightly decreased from 11.5 a few days ago.  Urinalysis showed 250 leukocytes.  She has some white blood cells in the urine as well.  Patient is started on oral Keflex.  She has been discussed with gynecology.  Dr. Rich will come evaluate patient in the emergency room to likely discontinue IUD and determine if this is a source of her discomfort.  Patient is signed out to oncoming physician pending evaluation by gynecology and final disposition.                Patient History   Past Medical History:   Diagnosis Date    Acute vaginitis     Bacterial vaginosis    Anogenital (venereal) warts 2016    Genital warts    Encounter for immunization 2018    Need for Tdap vaccination    Other specified health status 2016    No pertinent past medical history     Past Surgical History:   Procedure Laterality Date     SECTION, CLASSIC       Section     SECTION, LOW TRANSVERSE      WISDOM TOOTH EXTRACTION       Family History   Problem Relation Name Age of Onset    Asthma Mother Prisca Santana     Asthma Daughter      Asthma Son       Social History     Tobacco Use    Smoking status: Never    Smokeless tobacco: Never   Vaping Use    Vaping status: Never Used   Substance Use Topics    Alcohol use: Never    Drug use: Never       Physical Exam   ED Triage Vitals   Temperature Heart Rate Respirations BP   24 0942 24 0942 24 0942 24 0942   36.8 °C (98.3 °F) 76 16 115/81      Pulse Ox Temp src Heart Rate Source Patient Position   24 0942 -- 24 1522 24 1522   99 %  Monitor Lying      BP Location FiO2 (%)     24 1522 --     Right arm        Physical Exam      ED Course & MDM   Diagnoses as of 24 1527   Pelvic pain   UTI (urinary tract infection) with pyuria                       Olivier Coma Scale Score: 15                        Medical Decision Making      Procedure  Procedures     Desi  MD Jose Daniel  07/22/24 1528       Desi Sky MD  07/22/24 6629

## 2024-07-22 NOTE — ED TRIAGE NOTES
Pt is A/Ox4 coming in for 10/10 ABD pain. PT was recently evaluated for kidney stones and was treated today PT had a sudden onset of flank/ABD pain with nausea.no blood in the urine, PT is experiencing an intense pressure in the abdomen.

## 2024-07-22 NOTE — ED PROVIDER NOTES
Emergency Department Transition of Care Note       Signout   I received Ravi Low in signout from Dr. Pineda.  Please see the ED Provider Note for all HPI, PE and MDM up to the time of signout which is documented in the ED course.  This is in addition to the primary record.    See ED course of sign out comments.    ED Course & Medical Decision Making   ED Course:  ED Course as of 07/23/24 1817   Mon Jul 22, 2024   1551 SS for CCR    Pelvic and R flank pain. CT and pelvic US done. Has L 4 mm kidney stone vs calcification, IUD in myometrium. UA shows UTI, getting keflex. Gyn going to come remove IUD and will review imaging [SS]   1659 Personally discussed currently available facts and concerns about the case with Dr. Rich with GYN, who advises attempted to remove patient's IUD at bedside but unsuccessful.  Feels that most of patient's discomfort is likely related to malpositioned IUD.  Will work on getting her scheduled in clinic for follow-up, may ultimately require hysteroscopy but will need to resolve UTI before this can take place.   [SS]      ED Course User Index  [SS] Steffen Simerlink, MD         Diagnoses as of 07/23/24 1817   Pelvic pain   UTI (urinary tract infection) with pyuria   Malpositioned intrauterine device (IUD), initial encounter       Medical Decision Making:  Under my care, GYN attempted to remove malpositioned UTI in the ED, but were unsuccessful.  GYN advises that they will schedule patient for outpatient follow-up, may ultimately require hysteroscopy for removal, but patient's UTI will need to be resolved before they can consider this.  Patient discharged in stable condition with course of Keflex for treatment of UTI.    Disposition   As a result of the work-up, the patient was discharged home.  she was informed of her diagnosis and instructed to come back with any concerns or worsening of condition.  she and was agreeable to the plan as discussed above.  she was given the opportunity  to ask questions.  All of the patient's questions were answered.    Procedures   Procedures    Steffen Simerlink, MD Steffen Simerlink, MD  07/23/24 [id number]

## 2024-07-23 LAB — BACTERIA UR CULT: NORMAL

## 2024-07-23 NOTE — H&P
History Of Present Illness  Ravi oLw is a 33 y.o. female presenting with IP GYN HPI/REASON FOR CONSULT: Malpositioned IUD  .     Past Medical History  She has a past medical history of Acute vaginitis, Anogenital (venereal) warts (2016), Encounter for immunization (2018), and Other specified health status (2016).    Surgical History  She has a past surgical history that includes  section, classic;  section, low transverse; and Dingess tooth extraction.     OB History  OB History    Para Term  AB Living   2 2       2   SAB IAB Ectopic Multiple Live Births           2      # Outcome Date GA Lbr Luis F/2nd Weight Sex Type Anes PTL Lv   2 Para            1 Para                Sexual History  Social History     Substance and Sexual Activity   Sexual Activity Yes    Partners: Male    Birth control/protection: I.U.D.        Social History  She reports that she has never smoked. She has never used smokeless tobacco. She reports that she does not drink alcohol and does not use drugs.    Family History  Family History   Problem Relation Name Age of Onset    Asthma Mother Prisca Santana     Asthma Daughter      Asthma Son          Allergies  Patient has no known allergies.    Review of Systems   Genitourinary:  Positive for pelvic pain.        Physical Exam  Vitals reviewed. Exam conducted with a chaperone present.   Constitutional:       General: She is not in acute distress.     Appearance: She is not ill-appearing.   Pulmonary:      Effort: Pulmonary effort is normal.   Genitourinary:     General: Normal vulva.      Exam position: Lithotomy position.      Vagina: No signs of injury and foreign body. No vaginal discharge (Thick white vaginal discharge), erythema, tenderness, bleeding, lesions or prolapsed vaginal walls.      Cervix: No discharge, friability, lesion, erythema or cervical bleeding.      Comments: IUD strings visible at external os  Neurological:      Mental  "Status: She is alert.          Last Recorded Vitals  Blood pressure 114/76, pulse 72, temperature 36.8 °C (98.3 °F), resp. rate 18, height 1.727 m (5' 8\"), weight 73.5 kg (162 lb), SpO2 100%.    Relevant Results  Medications  No current facility-administered medications for this encounter.    Current Outpatient Medications:     acetaminophen (Tylenol) 500 mg tablet, Take 2 tablets (1,000 mg) by mouth every 6 hours if needed for mild pain (1 - 3) for up to 10 days., Disp: 30 tablet, Rfl: 0    acetaminophen (Tylenol) 500 mg tablet, Take 2 tablets (1,000 mg) by mouth every 6 hours if needed for mild pain (1 - 3) for up to 10 days., Disp: 30 tablet, Rfl: 0    albuterol 90 mcg/actuation inhaler, Inhale 2 puffs., Disp: , Rfl:     cephalexin (Keflex) 500 mg capsule, Take 1 capsule (500 mg) by mouth 4 times a day for 10 days., Disp: 40 capsule, Rfl: 0    ibuprofen 600 mg tablet, Take 1 tablet (600 mg) by mouth every 6 hours if needed for mild pain (1 - 3) for up to 7 days., Disp: 30 tablet, Rfl: 0    metroNIDAZOLE (Flagyl) 500 mg tablet, Take 1 tablet (500 mg) by mouth 3 times a day for 10 days., Disp: 30 tablet, Rfl: 0    naproxen (Naprosyn) 500 mg tablet, Take 1 tablet (500 mg) by mouth 2 times daily (morning and late afternoon) for 15 days., Disp: 30 tablet, Rfl: 0    tamsulosin (Flomax) 0.4 mg 24 hr capsule, Take 1 capsule (0.4 mg) by mouth once daily., Disp: 3 capsule, Rfl: 0    Labs  CBC  WBC   Date Value Ref Range Status   07/22/2024 9.1 4.4 - 11.3 x10*3/uL Final     nRBC   Date Value Ref Range Status   07/22/2024 0.0 0.0 - 0.0 /100 WBCs Final     RBC   Date Value Ref Range Status   07/22/2024 3.96 (L) 4.00 - 5.20 x10*6/uL Final     Hemoglobin   Date Value Ref Range Status   07/22/2024 10.7 (L) 12.0 - 16.0 g/dL Final     Hematocrit   Date Value Ref Range Status   07/22/2024 33.3 (L) 36.0 - 46.0 % Final     MCV   Date Value Ref Range Status   07/22/2024 84 80 - 100 fL Final     MCH   Date Value Ref Range Status "   07/22/2024 27.0 26.0 - 34.0 pg Final     MCHC   Date Value Ref Range Status   07/22/2024 32.1 32.0 - 36.0 g/dL Final     RDW   Date Value Ref Range Status   07/22/2024 14.7 (H) 11.5 - 14.5 % Final     Platelets   Date Value Ref Range Status   07/22/2024 380 150 - 450 x10*3/uL Final       CMP  Glucose   Date Value Ref Range Status   07/22/2024 81 74 - 99 mg/dL Final     Sodium   Date Value Ref Range Status   07/22/2024 134 (L) 136 - 145 mmol/L Final     Potassium   Date Value Ref Range Status   07/22/2024 4.0 3.5 - 5.3 mmol/L Final     Comment:     MILD HEMOLYSIS DETECTED. The result may be falsely elevated due to hemolysis or other interferents. Clinical correlation is recommended. Repeat testing may be considered.     Chloride   Date Value Ref Range Status   07/22/2024 104 98 - 107 mmol/L Final     Bicarbonate   Date Value Ref Range Status   07/22/2024 23 21 - 32 mmol/L Final     Anion Gap   Date Value Ref Range Status   07/22/2024 11 10 - 20 mmol/L Final     Urea Nitrogen   Date Value Ref Range Status   07/22/2024 10 6 - 23 mg/dL Final     Creatinine   Date Value Ref Range Status   07/22/2024 0.61 0.50 - 1.05 mg/dL Final     eGFR   Date Value Ref Range Status   07/22/2024 >90 >60 mL/min/1.73m*2 Final     Comment:     Calculations of estimated GFR are performed using the 2021 CKD-EPI Study Refit equation without the race variable for the IDMS-Traceable creatinine methods.  https://jasn.asnjournals.org/content/early/2021/09/22/ASN.2205668117     Calcium   Date Value Ref Range Status   07/22/2024 8.6 8.6 - 10.3 mg/dL Final     Albumin   Date Value Ref Range Status   07/22/2024 3.8 3.4 - 5.0 g/dL Final     Alkaline Phosphatase   Date Value Ref Range Status   07/22/2024 67 33 - 110 U/L Final     Total Protein   Date Value Ref Range Status   07/22/2024 7.3 6.4 - 8.2 g/dL Final     AST   Date Value Ref Range Status   07/22/2024 57 (H) 9 - 39 U/L Final     Comment:     MILD HEMOLYSIS DETECTED. The result may be falsely  "elevated due to hemolysis or other interferents. Clinical correlation is recommended. Repeat testing may be considered.     Bilirubin, Total   Date Value Ref Range Status   07/22/2024 0.3 0.0 - 1.2 mg/dL Final     ALT   Date Value Ref Range Status   07/22/2024 40 7 - 45 U/L Final     Comment:     Patients treated with Sulfasalazine may generate falsely decreased results for ALT.       Coagulation   No results found for: \"PROTIME\", \"INR\", \"APTT\", \"FIBRINOGEN\"    Pregnancy Tests  HCG, Urine   Date Value Ref Range Status   07/22/2024 NEGATIVE NEGATIVE Final       Imaging   No results found for this or any previous visit.    CT abdomen pelvis w IV contrast    Result Date: 7/16/2024  Interpreted By:  Benji Camarena, STUDY: CT ABDOMEN PELVIS W IV CONTRAST;  7/16/2024 11:41 am   INDICATION: Hematuria, malaise, nausea and vomiting.   COMPARISON: 09/02/2022   ACCESSION NUMBER(S): [id number]   ORDERING CLINICIAN: ANTONIO AYOUB   TECHNIQUE: CT of the abdomen and pelvis was performed.  Standard contiguous axial images were obtained at 3 mm slice thickness through the abdomen and pelvis. Coronal and sagittal reconstructions at 3 mm slice thickness were generated and submitted for review.   75 ml of contrast Omnipaque 350 were administered intravenously without immediate complication.   FINDINGS: LOWER CHEST: Within normal limits.   ABDOMEN:   LIVER: Within normal limits.   BILE DUCTS: Normal caliber.   GALLBLADDER: No calcified stones. No wall thickening.   PANCREAS: Within normal limits.   SPLEEN: Within normal limits.   ADRENAL GLANDS: Within normal limits.   KIDNEYS AND URETERS: No perinephric stranding. Symmetric enhancement. No hydronephrosis or hydroureter. Mm calcification in left pelvis, series 201 image 113; favor vascular rather than ureteral; not readily apparent on the 2022 exam.   PELVIS:   BLADDER: Nearly concentric wall thickening to 6 mm caliber.   REPRODUCTIVE ORGANS: IUD observed. The longer stem of the " device into may extend into myometrium anteriorly up to serosal surface. The orientation and position stable compared to 09/02/2022 CT.   BOWEL: The stomach, small and large bowel are normal in appearance without wall thickening or obstruction.   VESSELS: There is no aneurysmal dilatation of the abdominal aorta. The IVC appears normal.   PERITONEUM/RETROPERITONEUM/LYMPH NODES: No ascites or free air, no fluid collection.  No abdominopelvic lymphadenopathy is present.   BONES AND ABDOMINAL WALL: No suspicious osseous lesions are identified.  The abdominal wall soft tissues appear normal.       4 mm calcification in left pelvis, for which a vascular calcification is favored with ureteral calculus additional consideration but no ureteral dilatation, hydronephrosis, or perinephric stranding or abnormal renal enhancement.   Thickened urinary bladder, suggesting cystitis.   IUD stem traverses into the myometrium anteriorly. Overall stable position and uterine morphology compared to CT September 20, 2022.   MACRO: None   Signed by: Benji Camarena 7/16/2024 12:28 PM Dictation workstation:   [id number]    US pelvis transvaginal    Result Date: 7/16/2024  Interpreted By:  Tera Hart, STUDY: US PELVIS TRANSVAGINAL;  7/16/2024 11:20 am   INDICATION: Signs/Symptoms:with dopplers, r/o TOA, and ovarian torsion.   COMPARISON: None.   ACCESSION NUMBER(S): [id number]   ORDERING CLINICIAN: ANTONIO AYOUB   TECHNIQUE: Multiple multiplanar static gray scale, color and spectral waveform sonographic images of the pelvis were obtained. Transabdominal and endovaginal ultrasound was performed.   FINDINGS: UTERUS: The uterus measures  5.3 cm x 4.6 cm x 10.7 cm. No myometrial lesions are detected.   ENDOMETRIUM: The endometrium measures a thickness of 0.2 cm, which is normal. There is an intrauterine device present which, on cine clip imaging, appears to extend to the region of the uterine wall and may traverse the outside the uterine  wall, incompletely assessed on the current exam.   RIGHT ADNEXA: The right ovary measures 2.2 cm x 1.4 cm x 2.6 cm and demonstrates normal flow. No gross right adnexal masses are seen, no hydrosalpinx.   LEFT ADNEXA: The left ovary measures 1.9 cm x 1.6 cm x 2.8  cm and demonstrates normal flow. No gross left adnexal masses are seen, no hydrosalpinx. Small cyst measuring up to 1.6 x 1.5 x 0.8 cm.   CUL DE SAC: No significant free fluid detected.       1. Intrauterine device present which appears abnormally situated and appears to extend to the uterine wall and may penetrate the uterine wall, incompletely assessed on the current exam. Consider CT imaging for further assessment.   MACRO: None   Signed by: Tera Hart 7/16/2024 11:50 AM Dictation workstation:   [id number]    Vascular US Abdomen or Pelvis Duplex Complete    Result Date: 7/16/2024  Interpreted By:  Tera Hart, STUDY: US PELVIS TRANSVAGINAL;  7/16/2024 11:20 am   INDICATION: Signs/Symptoms:with dopplers, r/o TOA, and ovarian torsion.   COMPARISON: None.   ACCESSION NUMBER(S): [id number]   ORDERING CLINICIAN: ANTONIO AYOUB   TECHNIQUE: Multiple multiplanar static gray scale, color and spectral waveform sonographic images of the pelvis were obtained. Transabdominal and endovaginal ultrasound was performed.   FINDINGS: UTERUS: The uterus measures  5.3 cm x 4.6 cm x 10.7 cm. No myometrial lesions are detected.   ENDOMETRIUM: The endometrium measures a thickness of 0.2 cm, which is normal. There is an intrauterine device present which, on cine clip imaging, appears to extend to the region of the uterine wall and may traverse the outside the uterine wall, incompletely assessed on the current exam.   RIGHT ADNEXA: The right ovary measures 2.2 cm x 1.4 cm x 2.6 cm and demonstrates normal flow. No gross right adnexal masses are seen, no hydrosalpinx.   LEFT ADNEXA: The left ovary measures 1.9 cm x 1.6 cm x 2.8  cm and demonstrates normal flow. No gross  left adnexal masses are seen, no hydrosalpinx. Small cyst measuring up to 1.6 x 1.5 x 0.8 cm.   CUL DE SAC: No significant free fluid detected.       1. Intrauterine device present which appears abnormally situated and appears to extend to the uterine wall and may penetrate the uterine wall, incompletely assessed on the current exam. Consider CT imaging for further assessment.   MACRO: None   Signed by: Tera Hart 7/16/2024 11:50 AM Dictation workstation:   [id number]        Assessment/Plan     Patient Nancy presented to the emergency department with worsening pelvic pain and the setting of recent diagnosis of kidney stone.  Patient was noted to have malpositioned IUD on previous CT scan so gynecology was consulted.  Patient was counseled that IUD removal may not resolve her pelvic pain and given malposition may not be possible.  Patient expressed understanding but desires to proceed with IUD removal.  IUD removal was attempted using speculum and ring forceps but despite significant traction applied to the IUD strings with ring forceps IUD remained firmly in situ.  Decision was made to abort procedure and have patient follow-up in the office once her kidney stones and vaginal infections had resolved.  Of note patient with copious thick white discharge throughout vagina consistent with vaginal yeast infection.  Will recommend treatment with fluconazole 150 mg x 1.  Also recommend around-the-clock Tylenol and Motrin for pain control.  Will have office staff contact patient for follow-up in 1 month.         I spent 45 minutes in the professional and overall care of this patient.      Vik Rich MD

## 2024-07-31 ENCOUNTER — APPOINTMENT (OUTPATIENT)
Dept: RADIOLOGY | Facility: HOSPITAL | Age: 34
End: 2024-07-31
Payer: COMMERCIAL

## 2024-07-31 ENCOUNTER — HOSPITAL ENCOUNTER (EMERGENCY)
Facility: HOSPITAL | Age: 34
Discharge: HOME | End: 2024-07-31
Payer: COMMERCIAL

## 2024-07-31 VITALS
SYSTOLIC BLOOD PRESSURE: 122 MMHG | HEIGHT: 68 IN | TEMPERATURE: 97 F | WEIGHT: 165 LBS | OXYGEN SATURATION: 98 % | RESPIRATION RATE: 18 BRPM | DIASTOLIC BLOOD PRESSURE: 75 MMHG | HEART RATE: 76 BPM | BODY MASS INDEX: 25.01 KG/M2

## 2024-07-31 DIAGNOSIS — T83.32XD MALPOSITIONED INTRAUTERINE DEVICE (IUD), SUBSEQUENT ENCOUNTER: Primary | ICD-10-CM

## 2024-07-31 DIAGNOSIS — R10.32 LEFT LOWER QUADRANT ABDOMINAL PAIN: ICD-10-CM

## 2024-07-31 DIAGNOSIS — U07.1 COVID-19: ICD-10-CM

## 2024-07-31 LAB
ALBUMIN SERPL BCP-MCNC: 3.9 G/DL (ref 3.4–5)
ALP SERPL-CCNC: 67 U/L (ref 33–110)
ALT SERPL W P-5'-P-CCNC: 13 U/L (ref 7–45)
ANION GAP SERPL CALC-SCNC: 10 MMOL/L (ref 10–20)
APPEARANCE UR: CLEAR
AST SERPL W P-5'-P-CCNC: 15 U/L (ref 9–39)
B-HCG SERPL-ACNC: <2 MIU/ML
BASOPHILS # BLD AUTO: 0.02 X10*3/UL (ref 0–0.1)
BASOPHILS NFR BLD AUTO: 0.4 %
BILIRUB SERPL-MCNC: 0.3 MG/DL (ref 0–1.2)
BILIRUB UR STRIP.AUTO-MCNC: NEGATIVE MG/DL
BUN SERPL-MCNC: 10 MG/DL (ref 6–23)
CALCIUM SERPL-MCNC: 8.8 MG/DL (ref 8.6–10.3)
CHLORIDE SERPL-SCNC: 104 MMOL/L (ref 98–107)
CO2 SERPL-SCNC: 25 MMOL/L (ref 21–32)
COLOR UR: COLORLESS
CREAT SERPL-MCNC: 0.61 MG/DL (ref 0.5–1.05)
EGFRCR SERPLBLD CKD-EPI 2021: >90 ML/MIN/1.73M*2
EOSINOPHIL # BLD AUTO: 0.28 X10*3/UL (ref 0–0.7)
EOSINOPHIL NFR BLD AUTO: 5.1 %
ERYTHROCYTE [DISTWIDTH] IN BLOOD BY AUTOMATED COUNT: 15.2 % (ref 11.5–14.5)
GLUCOSE SERPL-MCNC: 85 MG/DL (ref 74–99)
GLUCOSE UR STRIP.AUTO-MCNC: NORMAL MG/DL
HCT VFR BLD AUTO: 34.4 % (ref 36–46)
HGB BLD-MCNC: 11.3 G/DL (ref 12–16)
IMM GRANULOCYTES # BLD AUTO: 0.02 X10*3/UL (ref 0–0.7)
IMM GRANULOCYTES NFR BLD AUTO: 0.4 % (ref 0–0.9)
KETONES UR STRIP.AUTO-MCNC: NEGATIVE MG/DL
LEUKOCYTE ESTERASE UR QL STRIP.AUTO: NEGATIVE
LYMPHOCYTES # BLD AUTO: 2.67 X10*3/UL (ref 1.2–4.8)
LYMPHOCYTES NFR BLD AUTO: 48.6 %
MAGNESIUM SERPL-MCNC: 1.8 MG/DL (ref 1.6–2.4)
MCH RBC QN AUTO: 26.7 PG (ref 26–34)
MCHC RBC AUTO-ENTMCNC: 32.8 G/DL (ref 32–36)
MCV RBC AUTO: 81 FL (ref 80–100)
MONOCYTES # BLD AUTO: 0.47 X10*3/UL (ref 0.1–1)
MONOCYTES NFR BLD AUTO: 8.6 %
NEUTROPHILS # BLD AUTO: 2.03 X10*3/UL (ref 1.2–7.7)
NEUTROPHILS NFR BLD AUTO: 36.9 %
NITRITE UR QL STRIP.AUTO: NEGATIVE
NRBC BLD-RTO: 0 /100 WBCS (ref 0–0)
PH UR STRIP.AUTO: 5.5 [PH]
PLATELET # BLD AUTO: 384 X10*3/UL (ref 150–450)
POTASSIUM SERPL-SCNC: 3.9 MMOL/L (ref 3.5–5.3)
PROT SERPL-MCNC: 7.2 G/DL (ref 6.4–8.2)
PROT UR STRIP.AUTO-MCNC: NEGATIVE MG/DL
RBC # BLD AUTO: 4.23 X10*6/UL (ref 4–5.2)
RBC # UR STRIP.AUTO: NEGATIVE /UL
SARS-COV-2 RNA RESP QL NAA+PROBE: DETECTED
SODIUM SERPL-SCNC: 135 MMOL/L (ref 136–145)
SP GR UR STRIP.AUTO: 1.01
UROBILINOGEN UR STRIP.AUTO-MCNC: NORMAL MG/DL
WBC # BLD AUTO: 5.5 X10*3/UL (ref 4.4–11.3)

## 2024-07-31 PROCEDURE — 76770 US EXAM ABDO BACK WALL COMP: CPT | Performed by: RADIOLOGY

## 2024-07-31 PROCEDURE — 85025 COMPLETE CBC W/AUTO DIFF WBC: CPT | Performed by: EMERGENCY MEDICINE

## 2024-07-31 PROCEDURE — 2500000004 HC RX 250 GENERAL PHARMACY W/ HCPCS (ALT 636 FOR OP/ED): Performed by: STUDENT IN AN ORGANIZED HEALTH CARE EDUCATION/TRAINING PROGRAM

## 2024-07-31 PROCEDURE — 76856 US EXAM PELVIC COMPLETE: CPT

## 2024-07-31 PROCEDURE — 81003 URINALYSIS AUTO W/O SCOPE: CPT | Performed by: EMERGENCY MEDICINE

## 2024-07-31 PROCEDURE — 84702 CHORIONIC GONADOTROPIN TEST: CPT | Performed by: PHYSICIAN ASSISTANT

## 2024-07-31 PROCEDURE — 99285 EMERGENCY DEPT VISIT HI MDM: CPT | Mod: 25

## 2024-07-31 PROCEDURE — 83735 ASSAY OF MAGNESIUM: CPT | Performed by: PHYSICIAN ASSISTANT

## 2024-07-31 PROCEDURE — 96372 THER/PROPH/DIAG INJ SC/IM: CPT | Performed by: STUDENT IN AN ORGANIZED HEALTH CARE EDUCATION/TRAINING PROGRAM

## 2024-07-31 PROCEDURE — 80053 COMPREHEN METABOLIC PANEL: CPT | Performed by: EMERGENCY MEDICINE

## 2024-07-31 PROCEDURE — 2500000001 HC RX 250 WO HCPCS SELF ADMINISTERED DRUGS (ALT 637 FOR MEDICARE OP): Performed by: STUDENT IN AN ORGANIZED HEALTH CARE EDUCATION/TRAINING PROGRAM

## 2024-07-31 PROCEDURE — 76856 US EXAM PELVIC COMPLETE: CPT | Performed by: RADIOLOGY

## 2024-07-31 PROCEDURE — 87635 SARS-COV-2 COVID-19 AMP PRB: CPT | Performed by: EMERGENCY MEDICINE

## 2024-07-31 PROCEDURE — 36415 COLL VENOUS BLD VENIPUNCTURE: CPT | Performed by: EMERGENCY MEDICINE

## 2024-07-31 PROCEDURE — 76770 US EXAM ABDO BACK WALL COMP: CPT

## 2024-07-31 RX ORDER — HYDROCODONE BITARTRATE AND ACETAMINOPHEN 5; 325 MG/1; MG/1
1 TABLET ORAL EVERY 6 HOURS PRN
Qty: 8 TABLET | Refills: 0 | Status: SHIPPED | OUTPATIENT
Start: 2024-07-31 | End: 2024-08-03

## 2024-07-31 RX ORDER — HYDROCODONE BITARTRATE AND ACETAMINOPHEN 5; 325 MG/1; MG/1
1 TABLET ORAL ONCE
Status: COMPLETED | OUTPATIENT
Start: 2024-07-31 | End: 2024-07-31

## 2024-07-31 RX ORDER — KETOROLAC TROMETHAMINE 30 MG/ML
30 INJECTION, SOLUTION INTRAMUSCULAR; INTRAVENOUS ONCE
Status: COMPLETED | OUTPATIENT
Start: 2024-07-31 | End: 2024-07-31

## 2024-07-31 ASSESSMENT — PAIN SCALES - GENERAL
PAINLEVEL_OUTOF10: 4
PAINLEVEL_OUTOF10: 9

## 2024-07-31 ASSESSMENT — PAIN DESCRIPTION - LOCATION: LOCATION: ABDOMEN

## 2024-07-31 ASSESSMENT — PAIN - FUNCTIONAL ASSESSMENT: PAIN_FUNCTIONAL_ASSESSMENT: 0-10

## 2024-07-31 NOTE — ED PROVIDER NOTES
HPI   Chief Complaint   Patient presents with    Abdominal Pain       Patient is a 33-year-old female with pertinent past medical history of IUD placement who presents for the third time in the last month to the emergency department with continued abdominal pain.  Patient states that she is having left lower quadrant and adnexal tenderness to palpation with right lower back pain as well as cough congestion rhinorrhea.  She denies any current nausea vomiting but did have an episode of diarrhea.  Also has noted fevers chills and some myalgias, the fevers and chills have resolved.  Denies any vaginal bleeding or discharge.  Denies any shortness of breath or chest pain.  She denies any dysuria or hematuria.              Patient History   Past Medical History:   Diagnosis Date    Acute vaginitis     Bacterial vaginosis    Anogenital (venereal) warts 2016    Genital warts    Encounter for immunization 2018    Need for Tdap vaccination    Other specified health status 2016    No pertinent past medical history     Past Surgical History:   Procedure Laterality Date     SECTION, CLASSIC       Section     SECTION, LOW TRANSVERSE      WISDOM TOOTH EXTRACTION       Family History   Problem Relation Name Age of Onset    Asthma Mother Prisca Santana     Asthma Daughter      Asthma Son       Social History     Tobacco Use    Smoking status: Never    Smokeless tobacco: Never   Vaping Use    Vaping status: Never Used   Substance Use Topics    Alcohol use: Never    Drug use: Never       Physical Exam   ED Triage Vitals   Temperature Heart Rate Respirations BP   24 0813 24 0813 24 0813 24 0813   36.1 °C (97 °F) 86 16 114/83      Pulse Ox Temp Source Heart Rate Source Patient Position   24 0813 24 0813 24 0903 24 0903   99 % Temporal Monitor Sitting      BP Location FiO2 (%)     24 0903 --     Right arm        Physical Exam  Vitals and  nursing note reviewed.   Constitutional:       Appearance: She is ill-appearing.   HENT:      Head: Atraumatic.      Nose: Congestion and rhinorrhea present.      Mouth/Throat:      Pharynx: Oropharynx is clear.   Eyes:      Pupils: Pupils are equal, round, and reactive to light.   Cardiovascular:      Rate and Rhythm: Regular rhythm. Tachycardia present.      Heart sounds: Normal heart sounds.   Pulmonary:      Effort: Pulmonary effort is normal. No respiratory distress.      Breath sounds: Normal breath sounds.   Abdominal:      General: Abdomen is flat.      Palpations: Abdomen is soft.      Tenderness: There is abdominal tenderness in the left lower quadrant. There is guarding. There is no right CVA tenderness, left CVA tenderness or rebound.   Genitourinary:     Adnexa:         Left: Tenderness present.    Skin:     General: Skin is warm and dry.      Capillary Refill: Capillary refill takes less than 2 seconds.   Neurological:      General: No focal deficit present.      Mental Status: She is alert and oriented to person, place, and time.           ED Course & MDM   ED Course as of 07/31/24 2019 Wed Jul 31, 2024   1009 Impression  4 mm calcification in left pelvis, for which a vascular calcification  is favored with ureteral calculus additional consideration but no  ureteral dilatation, hydronephrosis, or perinephric stranding or  abnormal renal enhancement.      Thickened urinary bladder, suggesting cystitis.      IUD stem traverses into the myometrium anteriorly. Overall stable  position and uterine morphology compared to CT September 20, 2022.      MACRO:  None      Signed by: Benji Camarena 7/16/2024 12:28 PM     [KK]   1009 Coronavirus 2019, PCR(!): Detected [KK]   1009 Leukocyte Esterase, Urine: NEGATIVE [KK]   1009 Nitrite, Urine: NEGATIVE [KK]   1009 Ketones, Urine: NEGATIVE [KK]   1050 Patient is a 33-year-old female with pertinent past medical history of IUD placement who presents for the third time  in the last month to the emergency department with continued abdominal pain.  Patient states that she is having left lower quadrant and adnexal tenderness to palpation with right lower back pain as well as cough congestion rhinorrhea.  She denies any current nausea vomiting but did have an episode of diarrhea.  Also has noted fevers chills and some myalgias, the fevers and chills have resolved.  Denies any vaginal bleeding or discharge.  Denies any shortness of breath or chest pain.  She denies any dysuria or hematuria.    On exam, patient is resting comfortably in no acute distress.  Heart lungs clear to auscultation, abdomen soft but is tender primarily left lower quadrant left adnexal region.  She does have some congestion and rhinorrhea.  Otherwise speaking full sentences not tachypneic.    Differential diagnose includes but not limited to COVID-19 infection, I did review patient's chart and she does have an IUD which has not been removed and is in her muscular region of her uterus, I suspect this is likely her pain .  Her previous CT also showed a possible kidney stone, this could also be considered as etiology of her pain so we will rule out urinary tract issues, will complete ultrasound of her kidneys and her uterus/ovaries to assess for possible cystic lesions hemorrhagic cyst, or evidence of ovarian torsion.  Could also consider underlying infection though less likely with patient having COVID-19 which would explain her fevers and chills. [KK]   1224 US renal complete  IMPRESSION:  No hydronephrosis bilaterally.      Partially distended urinary bladder containing nonspecific echogenic  debris.   [KK]   1312 HCG, Beta-Quantitative: <2 [KK]   1312 US pelvis  IMPRESSION:  Unremarkable transabdominal appearance of both ovaries.      Malpositioned/displaced IUD partially located in the left mid uterus  similar to CT 07/16/2024, per patient report, removal planned for  tomorrow.   [KK]   1312 Lab work is  unremarkable including a negative pregnancy, ultrasound showing no hydro, [KK]   1312  no ovarian pathology.  Suspect patient will be appropriate to follow-up with her OB/GYN tomorrow and symptomatic care for her COVID-19. [KK]      ED Course User Index  [KK] Nacho Morales DO         Diagnoses as of 07/31/24 2019   Left lower quadrant abdominal pain   Malpositioned intrauterine device (IUD), subsequent encounter   COVID-19                       Olivier Coma Scale Score: 15                        Medical Decision Making  Patient presented with abdominal pain as well as upper respiratory type infection symptoms.  Review of patient's chart showing IUD implanted into the uterine wall that has not been removed yet.  Patient's pain pattern consistent with this though does have a history of an abnormal calcification and her pain seems to have migrated more towards her left adnexa.  Ultrasound imaging of the kidneys shows no hydro and ovaries completely unremarkable.  URI workup consistent with COVID-19.  Patient signs and symptoms explained by COVID-19 infection and her IUD that is misplaced.  She has follow-up to remove this tomorrow.  Patient was discharged with instructions on COVID-19 symptomatic care and importance of follow-up to remove the IUD.  No signs and symptoms for underlying infection or overwhelming sepsis.  Appropriate for discharge with appropriate pain management with Norco..        Procedure  Procedures     Nacho Morales DO  07/31/24 2019

## 2024-07-31 NOTE — Clinical Note
Ravi Low was seen and treated in our emergency department on 7/31/2024.  She may return to work on 08/02/2024.       If you have any questions or concerns, please don't hesitate to call.      Nacho Morales, DO

## 2024-07-31 NOTE — ED NOTES
Community Resource Name: List of  primary care physicians and counseling resources.  Phone Number:   Staff Member:      Discussed the following topics on behalf of the patient:  []  Behavioral Health Assistance     []  Case Management  []   Assistance  []  Digital Equity Assistance  []  Dental Health Assistance  []  Education Assistance  []  Employment Assistance  []  Financial Strain Relief Assistance  []  Food Insecurity Assistance  [x]  Healthcare Coverage Assistance  []  Housing Stability Assistance  []  IP Violence Relief Assistance  []  Legal Assistance  []  Physical Activity Assistance  [x]  Social Connection Assistance  []  Stress Relief Assistance   []  Substance Abuse Assistance  []  Transportation Assistance  []  Utility Assistance  [x]  Other: [insert comment here]  Patient doesn't have a PCP and asked for some counseling resources.  Next Steps:         BRENT Lea   CHW talked to patient regarding not having a primary care physician. CHW asked the patient if she would be interested in looking at a list of  physician to choose from for future services. Patient agreed and was given a list of  physicians, they are accepting new patients, they are taking her insurance([employer]), and they are in the area where she lives. Patient asked for information regarding treatment and counseling. CHW provided additional information on outside treatment and counseling resources for inpatient and outpatient. Patient expressed appreciation.      BRENT Lea  07/31/24 [id number]

## 2024-07-31 NOTE — ED TRIAGE NOTES
Pt presents to the ED with c/o abodminal/side pain and lightheadedness. Pt states she has been coming almost every week for the  last few weeks and has gotten no relief of symptoms. Pt was taking antibx and BV medications but with no relief. Pt is still feeling dizzy and lightheaded and feels like she is getting a cold. This is also all accompanied by abdominal and side pain.

## 2024-08-01 ENCOUNTER — PROCEDURE VISIT (OUTPATIENT)
Dept: OBSTETRICS AND GYNECOLOGY | Facility: CLINIC | Age: 34
End: 2024-08-01
Payer: COMMERCIAL

## 2024-08-01 VITALS
BODY MASS INDEX: 25.49 KG/M2 | HEIGHT: 68 IN | SYSTOLIC BLOOD PRESSURE: 124 MMHG | WEIGHT: 168.2 LBS | DIASTOLIC BLOOD PRESSURE: 85 MMHG

## 2024-08-01 DIAGNOSIS — Z30.013 ENCOUNTER FOR INITIAL PRESCRIPTION OF INJECTABLE CONTRACEPTIVE: ICD-10-CM

## 2024-08-01 DIAGNOSIS — Z30.432 ENCOUNTER FOR IUD REMOVAL: Primary | ICD-10-CM

## 2024-08-01 DIAGNOSIS — F43.21 UNRESOLVED GRIEF: ICD-10-CM

## 2024-08-01 DIAGNOSIS — T83.32XA MALPOSITIONED INTRAUTERINE DEVICE (IUD), INITIAL ENCOUNTER: ICD-10-CM

## 2024-08-01 DIAGNOSIS — Z00.00 HEALTHCARE MAINTENANCE: ICD-10-CM

## 2024-08-01 PROCEDURE — 96372 THER/PROPH/DIAG INJ SC/IM: CPT | Performed by: NURSE PRACTITIONER

## 2024-08-01 PROCEDURE — 58301 REMOVE INTRAUTERINE DEVICE: CPT | Performed by: NURSE PRACTITIONER

## 2024-08-01 RX ORDER — SERTRALINE HYDROCHLORIDE 50 MG/1
50 TABLET, FILM COATED ORAL DAILY
Qty: 30 TABLET | Refills: 1 | Status: SHIPPED | OUTPATIENT
Start: 2024-08-01 | End: 2025-08-01

## 2024-08-01 RX ORDER — MEDROXYPROGESTERONE ACETATE 150 MG/ML
150 INJECTION, SUSPENSION INTRAMUSCULAR ONCE
Status: COMPLETED | OUTPATIENT
Start: 2024-08-01 | End: 2024-08-01

## 2024-08-01 ASSESSMENT — PATIENT HEALTH QUESTIONNAIRE - PHQ9
SUM OF ALL RESPONSES TO PHQ9 QUESTIONS 1 AND 2: 0
2. FEELING DOWN, DEPRESSED OR HOPELESS: NOT AT ALL
1. LITTLE INTEREST OR PLEASURE IN DOING THINGS: NOT AT ALL

## 2024-08-01 ASSESSMENT — ENCOUNTER SYMPTOMS
CONSTITUTIONAL NEGATIVE: 0
HEMATOLOGIC/LYMPHATIC NEGATIVE: 0
RESPIRATORY NEGATIVE: 0
PSYCHIATRIC NEGATIVE: 0
EYES NEGATIVE: 0
CARDIOVASCULAR NEGATIVE: 0
ALLERGIC/IMMUNOLOGIC NEGATIVE: 0
NEUROLOGICAL NEGATIVE: 0
GASTROINTESTINAL NEGATIVE: 0
MUSCULOSKELETAL NEGATIVE: 0
ENDOCRINE NEGATIVE: 0

## 2024-08-01 ASSESSMENT — PAIN SCALES - GENERAL: PAINLEVEL: 0-NO PAIN

## 2024-08-01 NOTE — PROGRESS NOTES
Patient ID: Ravi Low is a 33 y.o. female.    IUD Removal    Performed by: FLORIDA Sandoval  Authorized by: FLORIDA Sandoval    Procedure: IUD removal    Reason for removal: malposition    Strings visualized: yes    Tenaculum applied to cervix: no    IUD grasped by forceps: yes    Performed with ultrasound guidance: no    IUD removed: yes    Date/Time of Removal:  8/1/2024 10:08 AM  Removed without complications: yes    IUD intact: yes    Cervix manually dilated: no        Depo-Provera 150 mg given IM right deltoid after IUD removal complete.    Referrals placed for primary care as patient reports having kidney problems.  Referral placed to behavioral health as patient has unresolved grief over the death of her mother and feels overwhelmed with no support systems.  Prescription for 50 mg Zoloft p.o. daily sent to pharmacy to begin patient hopefully stabilization.

## 2024-08-02 ENCOUNTER — TELEPHONE (OUTPATIENT)
Dept: OBSTETRICS AND GYNECOLOGY | Facility: CLINIC | Age: 34
End: 2024-08-02
Payer: COMMERCIAL

## 2024-08-02 NOTE — TELEPHONE ENCOUNTER
KENYATTA GIBSON:    THIS PATIENT CALLED AND LEFT THE EMPLOYER EMAIL FOR THE DOCUMENTS TO BE SENT. THE EMAIL IS JANICE@@Pay.    THANK YOU

## 2024-08-06 ENCOUNTER — DOCUMENTATION (OUTPATIENT)
Dept: OBSTETRICS AND GYNECOLOGY | Facility: CLINIC | Age: 34
End: 2024-08-06
Payer: COMMERCIAL

## 2024-08-06 NOTE — PROGRESS NOTES
FMLA paperwork completed and submitted to provider for signature  Documentation will be uploaded to Eric Lovell RN

## 2024-09-06 ENCOUNTER — LAB (OUTPATIENT)
Dept: LAB | Facility: LAB | Age: 34
End: 2024-09-06
Payer: COMMERCIAL

## 2024-09-06 ENCOUNTER — APPOINTMENT (OUTPATIENT)
Dept: PRIMARY CARE | Facility: CLINIC | Age: 34
End: 2024-09-06
Payer: COMMERCIAL

## 2024-09-06 VITALS
RESPIRATION RATE: 20 BRPM | WEIGHT: 168 LBS | HEIGHT: 68 IN | BODY MASS INDEX: 25.46 KG/M2 | HEART RATE: 90 BPM | SYSTOLIC BLOOD PRESSURE: 108 MMHG | DIASTOLIC BLOOD PRESSURE: 72 MMHG | OXYGEN SATURATION: 93 %

## 2024-09-06 DIAGNOSIS — E53.8 COBALAMIN DEFICIENCY: ICD-10-CM

## 2024-09-06 DIAGNOSIS — N39.0 FREQUENT UTI: ICD-10-CM

## 2024-09-06 DIAGNOSIS — F41.1 GENERALIZED ANXIETY DISORDER: ICD-10-CM

## 2024-09-06 DIAGNOSIS — F32.2 MDD (MAJOR DEPRESSIVE DISORDER), SEVERE (MULTI): ICD-10-CM

## 2024-09-06 DIAGNOSIS — D50.9 IRON DEFICIENCY ANEMIA, UNSPECIFIED IRON DEFICIENCY ANEMIA TYPE: ICD-10-CM

## 2024-09-06 DIAGNOSIS — Z11.3 SCREENING EXAMINATION FOR STD (SEXUALLY TRANSMITTED DISEASE): ICD-10-CM

## 2024-09-06 DIAGNOSIS — D57.3 SICKLE CELL TRAIT (CMS-HCC): ICD-10-CM

## 2024-09-06 DIAGNOSIS — R35.0 FREQUENT URINATION: ICD-10-CM

## 2024-09-06 DIAGNOSIS — R35.0 FREQUENT URINATION: Primary | ICD-10-CM

## 2024-09-06 DIAGNOSIS — Z00.00 HEALTHCARE MAINTENANCE: ICD-10-CM

## 2024-09-06 PROBLEM — K52.9 INFLAMMATION OF STOMACH AND INTESTINE: Status: RESOLVED | Noted: 2024-09-06 | Resolved: 2024-09-06

## 2024-09-06 PROBLEM — R11.2 NAUSEA, VOMITING, AND DIARRHEA: Status: RESOLVED | Noted: 2024-09-06 | Resolved: 2024-09-06

## 2024-09-06 PROBLEM — R87.619 ABNORMAL CERVICAL PAPANICOLAOU SMEAR: Status: ACTIVE | Noted: 2020-09-23

## 2024-09-06 PROBLEM — J11.1 INFLUENZA: Status: RESOLVED | Noted: 2024-09-06 | Resolved: 2024-09-06

## 2024-09-06 PROBLEM — R42 VERTIGO: Status: RESOLVED | Noted: 2024-09-06 | Resolved: 2024-09-06

## 2024-09-06 PROBLEM — S93.402A LEFT ANKLE SPRAIN: Status: RESOLVED | Noted: 2024-09-06 | Resolved: 2024-09-06

## 2024-09-06 PROBLEM — F33.1 MAJOR DEPRESSIVE DISORDER, RECURRENT, MODERATE (MULTI): Chronic | Status: ACTIVE | Noted: 2024-08-13

## 2024-09-06 PROBLEM — F43.21 ADJUSTMENT DISORDER WITH DEPRESSED MOOD: Status: RESOLVED | Noted: 2024-08-08 | Resolved: 2024-09-06

## 2024-09-06 PROBLEM — N30.00 ACUTE CYSTITIS: Status: RESOLVED | Noted: 2023-04-05 | Resolved: 2024-09-06

## 2024-09-06 PROBLEM — R10.9 NONSPECIFIC ABDOMINAL PAIN: Status: RESOLVED | Noted: 2024-09-06 | Resolved: 2024-09-06

## 2024-09-06 PROBLEM — B96.89 BACTERIAL VAGINOSIS: Status: RESOLVED | Noted: 2020-09-23 | Resolved: 2024-09-06

## 2024-09-06 PROBLEM — N89.8 VAGINAL IRRITATION: Status: RESOLVED | Noted: 2024-09-06 | Resolved: 2024-09-06

## 2024-09-06 PROBLEM — H53.043 SUSPECTED AMBLYOPIA OF BOTH EYES: Status: ACTIVE | Noted: 2024-09-06

## 2024-09-06 PROBLEM — R39.9 SYMPTOMS INVOLVING URINARY SYSTEM: Status: RESOLVED | Noted: 2020-09-23 | Resolved: 2024-09-06

## 2024-09-06 PROBLEM — A63.0 GENITAL WARTS: Status: RESOLVED | Noted: 2020-09-23 | Resolved: 2024-09-06

## 2024-09-06 PROBLEM — R87.69 ABNORMAL CYTOLOGICAL FINDINGS IN FEMALE GENITAL ORGANS: Status: ACTIVE | Noted: 2024-09-06

## 2024-09-06 PROBLEM — A59.01 TRICHOMONAS VAGINITIS: Status: RESOLVED | Noted: 2024-09-06 | Resolved: 2024-09-06

## 2024-09-06 PROBLEM — H18.603 KERATOCONUS OF BOTH EYES: Status: ACTIVE | Noted: 2024-09-06

## 2024-09-06 PROBLEM — R87.611 PAP SMEAR OF CERVIX WITH ASCUS, CANNOT EXCLUDE HGSIL: Status: ACTIVE | Noted: 2024-09-06

## 2024-09-06 PROBLEM — H52.13 MYOPIA OF BOTH EYES: Status: ACTIVE | Noted: 2024-09-06

## 2024-09-06 PROBLEM — H53.043 AMBLYOPIA SUSPECT, BILATERAL: Status: ACTIVE | Noted: 2024-09-06

## 2024-09-06 PROBLEM — N76.0 BACTERIAL VAGINOSIS: Status: RESOLVED | Noted: 2020-09-23 | Resolved: 2024-09-06

## 2024-09-06 PROBLEM — R19.7 NAUSEA, VOMITING, AND DIARRHEA: Status: RESOLVED | Noted: 2024-09-06 | Resolved: 2024-09-06

## 2024-09-06 PROBLEM — H52.203 ASTIGMATISM OF BOTH EYES: Status: ACTIVE | Noted: 2024-09-06

## 2024-09-06 LAB
ALBUMIN SERPL BCP-MCNC: 3.8 G/DL (ref 3.4–5)
ALP SERPL-CCNC: 70 U/L (ref 33–110)
ALT SERPL W P-5'-P-CCNC: 19 U/L (ref 7–45)
ANION GAP SERPL CALC-SCNC: 12 MMOL/L (ref 10–20)
AST SERPL W P-5'-P-CCNC: 18 U/L (ref 9–39)
BASOPHILS # BLD AUTO: 0.03 X10*3/UL (ref 0–0.1)
BASOPHILS NFR BLD AUTO: 0.4 %
BILIRUB SERPL-MCNC: 0.4 MG/DL (ref 0–1.2)
BUN SERPL-MCNC: 8 MG/DL (ref 6–23)
CALCIUM SERPL-MCNC: 8.6 MG/DL (ref 8.6–10.3)
CHLORIDE SERPL-SCNC: 106 MMOL/L (ref 98–107)
CO2 SERPL-SCNC: 24 MMOL/L (ref 21–32)
CREAT SERPL-MCNC: 0.62 MG/DL (ref 0.5–1.05)
EGFRCR SERPLBLD CKD-EPI 2021: >90 ML/MIN/1.73M*2
EOSINOPHIL # BLD AUTO: 0.12 X10*3/UL (ref 0–0.7)
EOSINOPHIL NFR BLD AUTO: 1.8 %
ERYTHROCYTE [DISTWIDTH] IN BLOOD BY AUTOMATED COUNT: 14.9 % (ref 11.5–14.5)
GLUCOSE SERPL-MCNC: 78 MG/DL (ref 74–99)
HCT VFR BLD AUTO: 37.9 % (ref 36–46)
HGB BLD-MCNC: 12.2 G/DL (ref 12–16)
IMM GRANULOCYTES # BLD AUTO: 0.02 X10*3/UL (ref 0–0.7)
IMM GRANULOCYTES NFR BLD AUTO: 0.3 % (ref 0–0.9)
IRON SATN MFR SERPL: 11 % (ref 25–45)
IRON SERPL-MCNC: 37 UG/DL (ref 35–150)
LYMPHOCYTES # BLD AUTO: 2.68 X10*3/UL (ref 1.2–4.8)
LYMPHOCYTES NFR BLD AUTO: 39.2 %
MCH RBC QN AUTO: 26.3 PG (ref 26–34)
MCHC RBC AUTO-ENTMCNC: 32.2 G/DL (ref 32–36)
MCV RBC AUTO: 82 FL (ref 80–100)
MONOCYTES # BLD AUTO: 0.24 X10*3/UL (ref 0.1–1)
MONOCYTES NFR BLD AUTO: 3.5 %
NEUTROPHILS # BLD AUTO: 3.75 X10*3/UL (ref 1.2–7.7)
NEUTROPHILS NFR BLD AUTO: 54.8 %
NRBC BLD-RTO: 0 /100 WBCS (ref 0–0)
PLATELET # BLD AUTO: 384 X10*3/UL (ref 150–450)
POC APPEARANCE, URINE: CLEAR
POC BILIRUBIN, URINE: NEGATIVE
POC BLOOD, URINE: ABNORMAL
POC COLOR, URINE: YELLOW
POC GLUCOSE, URINE: NEGATIVE MG/DL
POC KETONES, URINE: NEGATIVE MG/DL
POC LEUKOCYTES, URINE: NEGATIVE
POC NITRITE,URINE: NEGATIVE
POC PH, URINE: 5.5 PH
POC PROTEIN, URINE: NEGATIVE MG/DL
POC SPECIFIC GRAVITY, URINE: 1.01
POC UROBILINOGEN, URINE: 0.2 EU/DL
POTASSIUM SERPL-SCNC: 4 MMOL/L (ref 3.5–5.3)
PROT SERPL-MCNC: 7.3 G/DL (ref 6.4–8.2)
RBC # BLD AUTO: 4.63 X10*6/UL (ref 4–5.2)
SODIUM SERPL-SCNC: 138 MMOL/L (ref 136–145)
TIBC SERPL-MCNC: 347 UG/DL (ref 240–445)
UIBC SERPL-MCNC: 310 UG/DL (ref 110–370)
VIT B12 SERPL-MCNC: 272 PG/ML (ref 211–911)
WBC # BLD AUTO: 6.8 X10*3/UL (ref 4.4–11.3)

## 2024-09-06 PROCEDURE — 99214 OFFICE O/P EST MOD 30 MIN: CPT

## 2024-09-06 PROCEDURE — 80053 COMPREHEN METABOLIC PANEL: CPT

## 2024-09-06 PROCEDURE — 83540 ASSAY OF IRON: CPT

## 2024-09-06 PROCEDURE — 85025 COMPLETE CBC W/AUTO DIFF WBC: CPT

## 2024-09-06 PROCEDURE — 84207 ASSAY OF VITAMIN B-6: CPT

## 2024-09-06 PROCEDURE — 36415 COLL VENOUS BLD VENIPUNCTURE: CPT

## 2024-09-06 PROCEDURE — 3008F BODY MASS INDEX DOCD: CPT

## 2024-09-06 PROCEDURE — 82607 VITAMIN B-12: CPT

## 2024-09-06 PROCEDURE — 81003 URINALYSIS AUTO W/O SCOPE: CPT

## 2024-09-06 PROCEDURE — 1036F TOBACCO NON-USER: CPT

## 2024-09-06 PROCEDURE — 87086 URINE CULTURE/COLONY COUNT: CPT

## 2024-09-06 PROCEDURE — 83550 IRON BINDING TEST: CPT

## 2024-09-06 RX ORDER — FLUCONAZOLE 150 MG/1
TABLET ORAL
COMMUNITY
Start: 2024-07-15

## 2024-09-06 ASSESSMENT — ENCOUNTER SYMPTOMS
RHINORRHEA: 0
NEUROLOGICAL NEGATIVE: 1
TREMORS: 0
ABDOMINAL DISTENTION: 0
WHEEZING: 0
ANAL BLEEDING: 0
COLOR CHANGE: 0
APNEA: 0
FATIGUE: 0
HEADACHES: 0
NAUSEA: 0
PHOTOPHOBIA: 0
DIFFICULTY URINATING: 0
DEPRESSION: 0
UNEXPECTED WEIGHT CHANGE: 0
BACK PAIN: 1
ABDOMINAL PAIN: 0
SPEECH DIFFICULTY: 0
BRUISES/BLEEDS EASILY: 0
SINUS PRESSURE: 0
SLEEP DISTURBANCE: 0
CONFUSION: 0
LOSS OF SENSATION IN FEET: 0
TROUBLE SWALLOWING: 0
HEMATOLOGIC/LYMPHATIC NEGATIVE: 1
OCCASIONAL FEELINGS OF UNSTEADINESS: 0
CONSTIPATION: 0
POLYDIPSIA: 0
HYPERACTIVE: 0
COUGH: 0
EYE DISCHARGE: 0
NERVOUS/ANXIOUS: 0
RECTAL PAIN: 0
ACTIVITY CHANGE: 0
EYES NEGATIVE: 1
DYSPHORIC MOOD: 0
LIGHT-HEADEDNESS: 0
APPETITE CHANGE: 0
CHILLS: 0
GASTROINTESTINAL NEGATIVE: 1
SEIZURES: 0
NUMBNESS: 0
NECK STIFFNESS: 0
FLANK PAIN: 0
VOICE CHANGE: 0
AGITATION: 0
POLYPHAGIA: 0
DIZZINESS: 0
NECK PAIN: 0
MYALGIAS: 0
CONSTITUTIONAL NEGATIVE: 1
FREQUENCY: 1
STRIDOR: 0
JOINT SWELLING: 0
PSYCHIATRIC NEGATIVE: 1
SORE THROAT: 0
ENDOCRINE NEGATIVE: 1
CARDIOVASCULAR NEGATIVE: 1
DIAPHORESIS: 0
DYSURIA: 0
RESPIRATORY NEGATIVE: 1
FEVER: 0
CHEST TIGHTNESS: 0
DIARRHEA: 0
WEAKNESS: 0
BLOOD IN STOOL: 0
PALPITATIONS: 0
HEMATURIA: 0
SHORTNESS OF BREATH: 0

## 2024-09-06 ASSESSMENT — PATIENT HEALTH QUESTIONNAIRE - PHQ9
10. IF YOU CHECKED OFF ANY PROBLEMS, HOW DIFFICULT HAVE THESE PROBLEMS MADE IT FOR YOU TO DO YOUR WORK, TAKE CARE OF THINGS AT HOME, OR GET ALONG WITH OTHER PEOPLE: NOT DIFFICULT AT ALL
2. FEELING DOWN, DEPRESSED OR HOPELESS: SEVERAL DAYS
1. LITTLE INTEREST OR PLEASURE IN DOING THINGS: NOT AT ALL
SUM OF ALL RESPONSES TO PHQ9 QUESTIONS 1 AND 2: 1

## 2024-09-06 NOTE — PROGRESS NOTES
Primary Care Provider: VIGNESH Noonan-CNP    Subjective   Ravi Low is a 33 y.o. female who presents for New Patient Visit (Frequent UTIs/?kidney stones).    HPI     NPV/ est care    Frequent UTI's July & August 2024- went to ER for this    Got her IUD removed, IUD removed 8/1/24; feeling slightly better     Anxiety & depression (MMD)- following with counselor; on zoloft    Sick cell trait  B vitamin def  RAMON    Mild right sided low back pain- bending over makes it worse    Interested in STD testing    Negative pregnancy testing 7/31/24    CT A/P 7/16/2024  Impression   4 mm calcification in left pelvis, for which a vascular calcification  is favored with ureteral calculus additional consideration but no  ureteral dilatation, hydronephrosis, or perinephric stranding or  abnormal renal enhancement.     Renal US 7/31/24  IMPRESSION:  No hydronephrosis bilaterally. Partially distended urinary bladder containing nonspecific echogenic  debris.    Pelvic US 7/31/24  IMPRESSION:  Unremarkable transabdominal appearance of both ovaries.    US pelvic transvaginal and Vascular US abdomen 7/16/2024 with Intrauterine device present which appears abnormally situated     IUD removed 8/1/24        Review of Systems   Constitutional: Negative.  Negative for activity change, appetite change, chills, diaphoresis, fatigue, fever and unexpected weight change.   HENT: Negative.  Negative for congestion, dental problem, ear discharge, ear pain, hearing loss, mouth sores, nosebleeds, postnasal drip, rhinorrhea, sinus pressure, sneezing, sore throat, tinnitus, trouble swallowing and voice change.    Eyes: Negative.  Negative for photophobia, discharge and visual disturbance.   Respiratory: Negative.  Negative for apnea, cough, chest tightness, shortness of breath, wheezing and stridor.    Cardiovascular: Negative.  Negative for chest pain, palpitations and leg swelling.   Gastrointestinal: Negative.  Negative for abdominal  "distention, abdominal pain, anal bleeding, blood in stool, constipation, diarrhea, nausea and rectal pain.   Endocrine: Negative.  Negative for cold intolerance, heat intolerance, polydipsia, polyphagia and polyuria.   Genitourinary:  Positive for frequency. Negative for decreased urine volume, difficulty urinating, dysuria, flank pain, hematuria and urgency.   Musculoskeletal:  Positive for back pain. Negative for gait problem, joint swelling, myalgias, neck pain and neck stiffness.   Skin: Negative.  Negative for color change and rash.   Neurological: Negative.  Negative for dizziness, tremors, seizures, syncope, speech difficulty, weakness, light-headedness, numbness and headaches.   Hematological: Negative.  Does not bruise/bleed easily.   Psychiatric/Behavioral: Negative.  Negative for agitation, confusion, dysphoric mood, sleep disturbance and suicidal ideas. The patient is not nervous/anxious and is not hyperactive.    All other systems reviewed and are negative.        Objective   /72   Pulse 90   Resp 20   Ht 1.727 m (5' 8\")   Wt 76.2 kg (168 lb)   SpO2 93% Comment: has long nails  BMI 25.54 kg/m²     Physical Exam  Vitals reviewed.   Constitutional:       General: She is not in acute distress.     Appearance: Normal appearance. She is normal weight. She is not ill-appearing, toxic-appearing or diaphoretic.   HENT:      Head: Normocephalic and atraumatic.      Nose: Nose normal.   Eyes:      Conjunctiva/sclera: Conjunctivae normal.   Cardiovascular:      Rate and Rhythm: Normal rate and regular rhythm.      Pulses: Normal pulses.      Heart sounds: Normal heart sounds. No murmur heard.     No friction rub. No gallop.   Pulmonary:      Effort: Pulmonary effort is normal. No respiratory distress.      Breath sounds: Normal breath sounds.   Abdominal:      General: Abdomen is flat. Bowel sounds are normal.      Palpations: Abdomen is soft.   Musculoskeletal:         General: Normal range of motion. "      Cervical back: Normal range of motion and neck supple.   Lymphadenopathy:      Cervical: No cervical adenopathy.   Skin:     General: Skin is warm and dry.      Capillary Refill: Capillary refill takes less than 2 seconds.   Neurological:      General: No focal deficit present.      Mental Status: She is alert and oriented to person, place, and time. Mental status is at baseline.   Psychiatric:         Mood and Affect: Mood normal.         Behavior: Behavior normal.         Thought Content: Thought content normal.         Judgment: Judgment normal.         Assessment/Plan   Problem List Items Addressed This Visit    NPV/ est care    Frequent UTI's July & August 2024- went to ER for this  Got her IUD removed, IUD removed 8/1/24; feeling slightly better   Negative pregnancy testing 7/31/24  CT A/P 7/16/2024  Impression   4 mm calcification in left pelvis, for which a vascular calcification  is favored with ureteral calculus additional consideration but no  ureteral dilatation, hydronephrosis, or perinephric stranding or  abnormal renal enhancement.   Renal US 7/31/24  IMPRESSION:  No hydronephrosis bilaterally. Partially distended urinary bladder containing nonspecific echogenic  debris.  Pelvic US 7/31/24  IMPRESSION:  Unremarkable transabdominal appearance of both ovaries.  US pelvic transvaginal and Vascular US abdomen 7/16/2024 with Intrauterine device present which appears abnormally situated   IUD removed 8/1/24           ICD-10-CM    MDD (major depressive disorder), severe (Multi) (Chronic) F32.2    Persisting  Continue following with counselor; continue with zoloft  No HI, no SI, no thoughts of self harm  Relevant Orders    Vitamin B12    Vitamin B6    Iron and TIBC    CBC and Auto Differential    Comprehensive metabolic panel    Lipid Panel    Frequent urination - Primary R35.0    Relevant Orders    Urine Culture    Comprehensive metabolic panel    C. trachomatis / N. gonorrhoeae, DNA probe     Trichomonas vaginalis, Amplified    Cobalamin deficiency E53.8    Relevant Orders    Vitamin B12    Vitamin B6    Iron and TIBC    CBC and Auto Differential    Comprehensive metabolic panel    Lipid Panel    Generalized anxiety disorder F41.1    Persisting  Continue following with counselor; continue with zoloft  Relevant Orders    Vitamin B12    Vitamin B6    Iron and TIBC    CBC and Auto Differential    Comprehensive metabolic panel    Lipid Panel    Iron deficiency anemia D50.9    Relevant Orders    Vitamin B12    Vitamin B6    Iron and TIBC    CBC and Auto Differential    Comprehensive metabolic panel    Lipid Panel    Sickle cell trait (CMS-HCC) D57.3    Stable  Check labs  Relevant Orders    Vitamin B12    Vitamin B6    Iron and TIBC    CBC and Auto Differential    Comprehensive metabolic panel    Lipid Panel    Frequent UTI N39.0    Relevant Orders    Urine Culture    Comprehensive metabolic panel    Screening examination for STD (sexually transmitted disease) Z11.3    Relevant Orders    C. trachomatis / N. gonorrhoeae, DNA probe    Trichomonas vaginalis, Amplified     Other Visit Diagnoses         Codes    Healthcare maintenance     Z00.00    Relevant Orders    POCT UA Automated manually resulted    Lipid Panel        Follow up in 3 months or sooner if needed

## 2024-09-08 LAB — BACTERIA UR CULT: NORMAL

## 2024-09-09 DIAGNOSIS — B37.31 VAGINAL YEAST INFECTION: Primary | ICD-10-CM

## 2024-09-09 RX ORDER — FLUCONAZOLE 150 MG/1
150 TABLET ORAL ONCE
Qty: 1 TABLET | Refills: 0 | Status: SHIPPED | OUTPATIENT
Start: 2024-09-09 | End: 2024-09-09

## 2024-09-11 LAB — PYRIDOXAL PHOS SERPL-SCNC: 28.4 NMOL/L (ref 20–125)

## 2024-10-14 ENCOUNTER — PATIENT MESSAGE (OUTPATIENT)
Dept: PRIMARY CARE | Facility: CLINIC | Age: 34
End: 2024-10-14
Payer: COMMERCIAL

## 2024-10-14 DIAGNOSIS — R30.0 BURNING WITH URINATION: Primary | ICD-10-CM

## 2024-10-15 ENCOUNTER — LAB (OUTPATIENT)
Dept: LAB | Facility: LAB | Age: 34
End: 2024-10-15
Payer: COMMERCIAL

## 2024-10-15 DIAGNOSIS — R35.0 URINARY FREQUENCY: Primary | ICD-10-CM

## 2024-10-15 DIAGNOSIS — B37.31 VAGINAL YEAST INFECTION: Primary | ICD-10-CM

## 2024-10-15 DIAGNOSIS — R35.0 URINARY FREQUENCY: ICD-10-CM

## 2024-10-15 DIAGNOSIS — R30.0 BURNING WITH URINATION: ICD-10-CM

## 2024-10-15 LAB
APPEARANCE UR: CLEAR
BACTERIA #/AREA URNS AUTO: ABNORMAL /HPF
BILIRUB UR STRIP.AUTO-MCNC: NEGATIVE MG/DL
COLOR UR: ABNORMAL
GLUCOSE UR STRIP.AUTO-MCNC: NORMAL MG/DL
KETONES UR STRIP.AUTO-MCNC: NEGATIVE MG/DL
LEUKOCYTE ESTERASE UR QL STRIP.AUTO: ABNORMAL
NITRITE UR QL STRIP.AUTO: NEGATIVE
PH UR STRIP.AUTO: 5 [PH]
PROT UR STRIP.AUTO-MCNC: NEGATIVE MG/DL
RBC # UR STRIP.AUTO: NEGATIVE /UL
RBC #/AREA URNS AUTO: ABNORMAL /HPF
SP GR UR STRIP.AUTO: 1.01
SQUAMOUS #/AREA URNS AUTO: ABNORMAL /HPF
UROBILINOGEN UR STRIP.AUTO-MCNC: NORMAL MG/DL
WBC #/AREA URNS AUTO: ABNORMAL /HPF

## 2024-10-15 PROCEDURE — 87086 URINE CULTURE/COLONY COUNT: CPT

## 2024-10-15 PROCEDURE — 81001 URINALYSIS AUTO W/SCOPE: CPT

## 2024-10-15 RX ORDER — FLUCONAZOLE 150 MG/1
150 TABLET ORAL ONCE
Qty: 1 TABLET | Refills: 0 | Status: SHIPPED | OUTPATIENT
Start: 2024-10-15 | End: 2024-10-15

## 2024-10-15 NOTE — PROGRESS NOTES
Patient called back and discussed. No concerns for STDs. Will treat her for vaginal yeast infection and culture is pending. She will follow up with OB/GYN if symptoms return for further testing- hx of BV.

## 2024-10-17 LAB — BACTERIA UR CULT: NORMAL

## 2024-10-28 ENCOUNTER — APPOINTMENT (OUTPATIENT)
Dept: OBSTETRICS AND GYNECOLOGY | Facility: CLINIC | Age: 34
End: 2024-10-28
Payer: COMMERCIAL

## 2024-10-28 VITALS
HEIGHT: 68 IN | SYSTOLIC BLOOD PRESSURE: 137 MMHG | BODY MASS INDEX: 26.07 KG/M2 | WEIGHT: 172 LBS | DIASTOLIC BLOOD PRESSURE: 83 MMHG

## 2024-10-28 DIAGNOSIS — R10.2 PELVIC PAIN: ICD-10-CM

## 2024-10-28 DIAGNOSIS — N39.0 URINARY TRACT INFECTION WITHOUT HEMATURIA, SITE UNSPECIFIED: Primary | ICD-10-CM

## 2024-10-28 DIAGNOSIS — Z30.42 ENCOUNTER FOR SURVEILLANCE OF INJECTABLE CONTRACEPTIVE: ICD-10-CM

## 2024-10-28 LAB
POC APPEARANCE, URINE: CLEAR
POC BILIRUBIN, URINE: NEGATIVE
POC BLOOD, URINE: NEGATIVE
POC COLOR, URINE: YELLOW
POC GLUCOSE, URINE: NEGATIVE MG/DL
POC KETONES, URINE: NEGATIVE MG/DL
POC LEUKOCYTES, URINE: NEGATIVE
POC NITRITE,URINE: POSITIVE
POC PH, URINE: 6.5 PH
POC PROTEIN, URINE: NEGATIVE MG/DL
POC SPECIFIC GRAVITY, URINE: 1.02
POC UROBILINOGEN, URINE: 0.2 EU/DL

## 2024-10-28 PROCEDURE — 96372 THER/PROPH/DIAG INJ SC/IM: CPT | Performed by: NURSE PRACTITIONER

## 2024-10-28 PROCEDURE — 81002 URINALYSIS NONAUTO W/O SCOPE: CPT | Performed by: NURSE PRACTITIONER

## 2024-10-28 RX ORDER — MEDROXYPROGESTERONE ACETATE 150 MG/ML
150 INJECTION, SUSPENSION INTRAMUSCULAR ONCE
Status: COMPLETED | OUTPATIENT
Start: 2024-10-28 | End: 2024-10-28

## 2024-10-28 RX ORDER — NITROFURANTOIN (MACROCRYSTALS) 100 MG/1
100 CAPSULE ORAL 2 TIMES DAILY
Qty: 14 CAPSULE | Refills: 0 | Status: SHIPPED | OUTPATIENT
Start: 2024-10-28 | End: 2024-11-04

## 2024-10-28 ASSESSMENT — PAIN SCALES - GENERAL: PAINLEVEL_OUTOF10: 0-NO PAIN

## 2024-10-28 ASSESSMENT — ENCOUNTER SYMPTOMS
LOSS OF SENSATION IN FEET: 0
OCCASIONAL FEELINGS OF UNSTEADINESS: 0
DEPRESSION: 0

## 2024-10-29 ENCOUNTER — TELEPHONE (OUTPATIENT)
Dept: OBSTETRICS AND GYNECOLOGY | Facility: CLINIC | Age: 34
End: 2024-10-29

## 2024-10-30 DIAGNOSIS — N39.0 URINARY TRACT INFECTION WITHOUT HEMATURIA, SITE UNSPECIFIED: Primary | ICD-10-CM

## 2024-12-06 ENCOUNTER — APPOINTMENT (OUTPATIENT)
Dept: PRIMARY CARE | Facility: CLINIC | Age: 34
End: 2024-12-06
Payer: COMMERCIAL

## 2024-12-18 ENCOUNTER — TELEPHONE (OUTPATIENT)
Dept: PRIMARY CARE | Facility: CLINIC | Age: 34
End: 2024-12-18
Payer: COMMERCIAL

## 2024-12-18 ENCOUNTER — LAB (OUTPATIENT)
Dept: LAB | Facility: LAB | Age: 34
End: 2024-12-18
Payer: COMMERCIAL

## 2024-12-18 DIAGNOSIS — R39.9 UTI SYMPTOMS: Primary | ICD-10-CM

## 2024-12-18 DIAGNOSIS — R39.9 UTI SYMPTOMS: ICD-10-CM

## 2024-12-18 PROCEDURE — 87086 URINE CULTURE/COLONY COUNT: CPT

## 2024-12-18 PROCEDURE — 87186 SC STD MICRODIL/AGAR DIL: CPT

## 2024-12-20 DIAGNOSIS — N39.0 URINARY TRACT INFECTION WITHOUT HEMATURIA, SITE UNSPECIFIED: Primary | ICD-10-CM

## 2024-12-20 LAB — BACTERIA UR CULT: ABNORMAL

## 2024-12-20 RX ORDER — SULFAMETHOXAZOLE AND TRIMETHOPRIM 800; 160 MG/1; MG/1
1 TABLET ORAL 2 TIMES DAILY
Qty: 6 TABLET | Refills: 0 | Status: SHIPPED | OUTPATIENT
Start: 2024-12-20 | End: 2024-12-23

## 2025-01-03 ENCOUNTER — APPOINTMENT (OUTPATIENT)
Dept: PRIMARY CARE | Facility: CLINIC | Age: 35
End: 2025-01-03
Payer: COMMERCIAL

## 2025-01-13 ENCOUNTER — APPOINTMENT (OUTPATIENT)
Dept: OBSTETRICS AND GYNECOLOGY | Facility: CLINIC | Age: 35
End: 2025-01-13
Payer: COMMERCIAL

## 2025-01-13 VITALS — WEIGHT: 169 LBS | BODY MASS INDEX: 25.7 KG/M2 | SYSTOLIC BLOOD PRESSURE: 118 MMHG | DIASTOLIC BLOOD PRESSURE: 75 MMHG

## 2025-01-13 DIAGNOSIS — Z30.42 ENCOUNTER FOR SURVEILLANCE OF INJECTABLE CONTRACEPTIVE: Primary | ICD-10-CM

## 2025-01-13 PROCEDURE — 96372 THER/PROPH/DIAG INJ SC/IM: CPT | Performed by: NURSE PRACTITIONER

## 2025-01-13 RX ORDER — MEDROXYPROGESTERONE ACETATE 150 MG/ML
150 INJECTION, SUSPENSION INTRAMUSCULAR ONCE
Status: COMPLETED | OUTPATIENT
Start: 2025-01-13 | End: 2025-01-13

## 2025-01-13 RX ADMIN — MEDROXYPROGESTERONE ACETATE 150 MG: 150 INJECTION, SUSPENSION INTRAMUSCULAR at 09:47

## 2025-01-13 ASSESSMENT — PAIN SCALES - GENERAL: PAINLEVEL_OUTOF10: 0-NO PAIN

## 2025-01-13 NOTE — PROGRESS NOTES
Ravi Low is being seen today for Depo-Provera. G(2)P(2)  Patient wishes to continue?Yes  Patient without side effects of None      Objective   /75   Wt 76.7 kg (169 lb)   BMI 25.70 kg/m²     Depo Provera 150mg  IM givenRight Deltoid    Return in 3months      Assessment/Plan   Problem List Items Addressed This Visit    None  Visit Diagnoses         Codes    Encounter for surveillance of injectable contraceptive    -  Primary Z30.42    Relevant Medications    medroxyPROGESTERone (Depo-Provera) injection 150 mg (Completed) (Start on 1/13/2025 10:15 AM)        Annual exam with next Depo-Provera

## 2025-02-18 ENCOUNTER — HOSPITAL ENCOUNTER (EMERGENCY)
Facility: HOSPITAL | Age: 35
Discharge: HOME | End: 2025-02-18
Attending: EMERGENCY MEDICINE
Payer: COMMERCIAL

## 2025-02-18 ENCOUNTER — APPOINTMENT (OUTPATIENT)
Dept: RADIOLOGY | Facility: HOSPITAL | Age: 35
End: 2025-02-18
Payer: COMMERCIAL

## 2025-02-18 VITALS
HEIGHT: 68 IN | RESPIRATION RATE: 14 BRPM | TEMPERATURE: 98.7 F | SYSTOLIC BLOOD PRESSURE: 113 MMHG | HEART RATE: 80 BPM | WEIGHT: 167 LBS | DIASTOLIC BLOOD PRESSURE: 78 MMHG | OXYGEN SATURATION: 97 % | BODY MASS INDEX: 25.31 KG/M2

## 2025-02-18 DIAGNOSIS — R11.10 VOMITING AND DIARRHEA: Primary | ICD-10-CM

## 2025-02-18 DIAGNOSIS — R19.7 VOMITING AND DIARRHEA: Primary | ICD-10-CM

## 2025-02-18 LAB
ALBUMIN SERPL BCP-MCNC: 4 G/DL (ref 3.4–5)
ALP SERPL-CCNC: 67 U/L (ref 33–110)
ALT SERPL W P-5'-P-CCNC: 17 U/L (ref 7–45)
ANION GAP SERPL CALC-SCNC: 10 MMOL/L (ref 10–20)
APPEARANCE UR: CLEAR
AST SERPL W P-5'-P-CCNC: 18 U/L (ref 9–39)
BASOPHILS # BLD AUTO: 0.02 X10*3/UL (ref 0–0.1)
BASOPHILS NFR BLD AUTO: 0.3 %
BILIRUB SERPL-MCNC: 0.4 MG/DL (ref 0–1.2)
BILIRUB UR STRIP.AUTO-MCNC: NEGATIVE MG/DL
BUN SERPL-MCNC: 9 MG/DL (ref 6–23)
CALCIUM SERPL-MCNC: 9.1 MG/DL (ref 8.6–10.3)
CHLORIDE SERPL-SCNC: 107 MMOL/L (ref 98–107)
CO2 SERPL-SCNC: 25 MMOL/L (ref 21–32)
COLOR UR: COLORLESS
CREAT SERPL-MCNC: 0.67 MG/DL (ref 0.5–1.05)
EGFRCR SERPLBLD CKD-EPI 2021: >90 ML/MIN/1.73M*2
EOSINOPHIL # BLD AUTO: 0.1 X10*3/UL (ref 0–0.7)
EOSINOPHIL NFR BLD AUTO: 1.3 %
ERYTHROCYTE [DISTWIDTH] IN BLOOD BY AUTOMATED COUNT: 13.8 % (ref 11.5–14.5)
GLUCOSE SERPL-MCNC: 86 MG/DL (ref 74–99)
GLUCOSE UR STRIP.AUTO-MCNC: NORMAL MG/DL
HCG UR QL IA.RAPID: NEGATIVE
HCT VFR BLD AUTO: 37.3 % (ref 36–46)
HGB BLD-MCNC: 12.7 G/DL (ref 12–16)
HOLD SPECIMEN: NORMAL
IMM GRANULOCYTES # BLD AUTO: 0.02 X10*3/UL (ref 0–0.7)
IMM GRANULOCYTES NFR BLD AUTO: 0.3 % (ref 0–0.9)
KETONES UR STRIP.AUTO-MCNC: NEGATIVE MG/DL
LEUKOCYTE ESTERASE UR QL STRIP.AUTO: NEGATIVE
LYMPHOCYTES # BLD AUTO: 2.51 X10*3/UL (ref 1.2–4.8)
LYMPHOCYTES NFR BLD AUTO: 33.8 %
MCH RBC QN AUTO: 27.4 PG (ref 26–34)
MCHC RBC AUTO-ENTMCNC: 34 G/DL (ref 32–36)
MCV RBC AUTO: 81 FL (ref 80–100)
MONOCYTES # BLD AUTO: 0.27 X10*3/UL (ref 0.1–1)
MONOCYTES NFR BLD AUTO: 3.6 %
NEUTROPHILS # BLD AUTO: 4.5 X10*3/UL (ref 1.2–7.7)
NEUTROPHILS NFR BLD AUTO: 60.7 %
NITRITE UR QL STRIP.AUTO: NEGATIVE
NRBC BLD-RTO: 0 /100 WBCS (ref 0–0)
PH UR STRIP.AUTO: 5.5 [PH]
PLATELET # BLD AUTO: 360 X10*3/UL (ref 150–450)
POTASSIUM SERPL-SCNC: 4.1 MMOL/L (ref 3.5–5.3)
PROT SERPL-MCNC: 7.7 G/DL (ref 6.4–8.2)
PROT UR STRIP.AUTO-MCNC: NEGATIVE MG/DL
RBC # BLD AUTO: 4.63 X10*6/UL (ref 4–5.2)
RBC # UR STRIP.AUTO: NEGATIVE MG/DL
SODIUM SERPL-SCNC: 138 MMOL/L (ref 136–145)
SP GR UR STRIP.AUTO: 1.01
UROBILINOGEN UR STRIP.AUTO-MCNC: NORMAL MG/DL
WBC # BLD AUTO: 7.4 X10*3/UL (ref 4.4–11.3)

## 2025-02-18 PROCEDURE — 85025 COMPLETE CBC W/AUTO DIFF WBC: CPT

## 2025-02-18 PROCEDURE — 2500000004 HC RX 250 GENERAL PHARMACY W/ HCPCS (ALT 636 FOR OP/ED): Performed by: EMERGENCY MEDICINE

## 2025-02-18 PROCEDURE — 96374 THER/PROPH/DIAG INJ IV PUSH: CPT

## 2025-02-18 PROCEDURE — 81003 URINALYSIS AUTO W/O SCOPE: CPT

## 2025-02-18 PROCEDURE — 36415 COLL VENOUS BLD VENIPUNCTURE: CPT

## 2025-02-18 PROCEDURE — 81025 URINE PREGNANCY TEST: CPT

## 2025-02-18 PROCEDURE — 84075 ASSAY ALKALINE PHOSPHATASE: CPT

## 2025-02-18 PROCEDURE — 99284 EMERGENCY DEPT VISIT MOD MDM: CPT | Mod: 25 | Performed by: EMERGENCY MEDICINE

## 2025-02-18 PROCEDURE — 74176 CT ABD & PELVIS W/O CONTRAST: CPT

## 2025-02-18 PROCEDURE — 74176 CT ABD & PELVIS W/O CONTRAST: CPT | Performed by: RADIOLOGY

## 2025-02-18 RX ORDER — ONDANSETRON HYDROCHLORIDE 2 MG/ML
4 INJECTION, SOLUTION INTRAVENOUS ONCE
Status: COMPLETED | OUTPATIENT
Start: 2025-02-18 | End: 2025-02-18

## 2025-02-18 RX ORDER — ONDANSETRON 4 MG/1
4 TABLET, ORALLY DISINTEGRATING ORAL EVERY 8 HOURS PRN
Qty: 9 TABLET | Refills: 0 | Status: SHIPPED | OUTPATIENT
Start: 2025-02-18 | End: 2025-02-21

## 2025-02-18 RX ADMIN — ONDANSETRON 4 MG: 2 INJECTION INTRAMUSCULAR; INTRAVENOUS at 15:47

## 2025-02-18 ASSESSMENT — PAIN DESCRIPTION - ONSET: ONSET: GRADUAL

## 2025-02-18 ASSESSMENT — PAIN DESCRIPTION - DESCRIPTORS
DESCRIPTORS: ACHING;CRAMPING
DESCRIPTORS: ACHING;DISCOMFORT

## 2025-02-18 ASSESSMENT — PAIN DESCRIPTION - PROGRESSION: CLINICAL_PROGRESSION: GRADUALLY WORSENING

## 2025-02-18 ASSESSMENT — PAIN DESCRIPTION - LOCATION: LOCATION: BACK

## 2025-02-18 ASSESSMENT — PAIN SCALES - GENERAL: PAINLEVEL_OUTOF10: 8

## 2025-02-18 ASSESSMENT — PAIN - FUNCTIONAL ASSESSMENT: PAIN_FUNCTIONAL_ASSESSMENT: 0-10

## 2025-02-18 ASSESSMENT — PAIN DESCRIPTION - FREQUENCY: FREQUENCY: CONSTANT/CONTINUOUS

## 2025-02-18 ASSESSMENT — PAIN DESCRIPTION - ORIENTATION: ORIENTATION: LEFT

## 2025-02-18 ASSESSMENT — PAIN DESCRIPTION - PAIN TYPE: TYPE: ACUTE PAIN

## 2025-02-18 NOTE — ED TRIAGE NOTES
Onset uti symtoms 7 days ago irritating while urinating and odor. N/V diarrhea 3 days ago. Last emesis this morning 10:21a. As well as diarrhea. Hx frequent UTI. Lmp unknown due to depo. Left flank pain. Denies pain in abd.

## 2025-02-18 NOTE — DISCHARGE INSTRUCTIONS
Take medications as indicated for length of time instructed.  Follow-up with your primary care doctor.  Return immediately if concerning symptoms, as discussed.

## 2025-02-18 NOTE — ED PROVIDER NOTES
HPI   Chief Complaint   Patient presents with    Abdominal Pain    Flank Pain    Nausea    Vomiting       HPI  Patient is a 34-year-old female with a past medical history significant for depression who presented to the emergency room with a chief complaint of abdominal pain, nausea vomiting and diarrhea.  It started a couple of days ago.  None of it is bloody or melanotic.  The diarrhea is very watery.  She is also been experiencing foul-smelling urine and frequency with left-sided flank pain.  She denies any fevers but does endorse chills.  She denies any sick contacts as far she is aware.      PMHx: As above but also includes anemia, frequent UTIs  PSHx: C-sections  FamilyHx: Denies  SocialHx: Denies  Allergies: NKDA  Medications: See Medication Reconciliation     ROS  As above otherwise denies    Physical Exam    GENERAL: Awake and Alert, No Acute Distress  HEENT: AT/NC, PERRL, EOMI, Normal Oropharynx, No Signs of Dehydration  NECK: Normal Inspection, No JVD  CARDIOVASCULAR: RRR, No M/R/G  RESPIRATORY: CTA Bilaterally, No Wheezes, Rales or Rhonchi, Chest Wall Non-tender  ABDOMEN: Soft, mildly diffusely tender abdomen, Normal Bowel Sounds, No Distention  BACK: No CVA Tenderness  SKIN: Normal Color, Warm, Dry, No Rashes   EXTREMITIES: Non-Tender, Full ROM, No Pedal Edema  NEURO: A&O x 3, Normal Motor and Sensation, Normal Mood and Affect    Nursing Assessment and Vitals Reviewed    Medical Decision  Patient is a 34-year-old female with a past medical history significant for depression who presented to the emergency room with a chief complaint of abdominal pain, nausea vomiting and diarrhea.  It started a couple of days ago.  None of it is bloody or melanotic.  The diarrhea is very watery.  She is also been experiencing foul-smelling urine and frequency with left-sided flank pain.  She denies any fevers but does endorse chills.  She denies any sick contacts as far she is aware.    Evaluation patient is seen in the  [facility] owing to large patient volumes.  At that time she is well-appearing and in no acute distress.  Abdomen is benign but she reports slight discomfort with palpation without rebound or guarding.  She has no CVA tenderness on my exam.    COVID patient included labs that did not reveal any emergent electrolyte imbalance.  She has no leukocytosis or anemia.  Urinalysis is within normal limits that she is negative for pregnancy.  CT of the abdomen pelvis did not show any acute abdominal pelvic pathology.    Patient remained in stable condition while in the ED.  She is given Zofran for nausea and vomiting.  Presentation concerning for gastroenteritis.  Will plan to discharge home with a prescription for Zofran and instructions on supportive care at home as well as the importance of following up closely with her primary care provider if able to tolerate PO.  She is thoroughly educated on signs and symptoms that should prompt immediate turn to emergency room.              Patient History   Past Medical History:   Diagnosis Date    Abdominal pain 2012    Acute cystitis 2023    Acute vaginitis     Bacterial vaginosis    Anogenital (venereal) warts 2016    Genital warts    Bacterial vaginosis 2020    Encounter for immunization 2018    Need for Tdap vaccination    Genital warts 2020    Hypokalemia 2012    Influenza 2024    Nausea and vomiting 2012    Nausea, vomiting, and diarrhea 2024    Nonspecific abdominal pain 2024    Other specified health status 2016    No pertinent past medical history    [facility]    Symptoms involving urinary system 2020    Trichomonas vaginitis 2024    Urinary tract infection 2024    Comment on above: STD CHECK POSS UTI      Vaginal irritation 2024    Vertigo 2024     Past Surgical History:   Procedure Laterality Date     SECTION, CLASSIC       Section      SECTION, LOW TRANSVERSE      WISDOM TOOTH EXTRACTION       Family History   Problem Relation Name Age of Onset    Asthma Mother Prisca Santana     Asthma Daughter      Asthma Son       Social History     Tobacco Use    Smoking status: Never    Smokeless tobacco: Never   Vaping Use    Vaping status: Never Used   Substance Use Topics    Alcohol use: Never    Drug use: Never       Physical Exam   ED Triage Vitals [25 1121]   Temperature Heart Rate Respirations BP   36.2 °C (97.2 °F) 92 18 126/76      Pulse Ox Temp Source Heart Rate Source Patient Position   100 % Temporal Monitor Sitting      BP Location FiO2 (%)     Right arm --       Physical Exam      ED Course & MDM   Diagnoses as of 25 1636   Vomiting and diarrhea                 No data recorded                                 Medical Decision Making      Procedure  Procedures     Desi Sky MD  25 1636

## 2025-02-18 NOTE — ED TRIAGE NOTES
As provider-in-triage, I performed a medical screening history and physical exam on this patient.  HISTORY OF PRESENT ILLNESS  Patient is a 34-year-old female with past medical history of depression presenting to the ED with concern for left-sided flank pain.  Pain has been present for the past couple of days.  Located in the left flank and radiates to the groin.  Pain is constant.  Endorses nausea and vomiting with 2 episodes of vomiting this morning.  Patient has had diarrhea as well this morning.  Endorses cloudy urine, foul-smelling urine, urinary frequency.  Denies fever but has had subjective chills.  Patient does not get a menstrual cycle and she is on Depo.     PHYSICAL EXAM  Vital Signs reviewed.  Cardiovascular: Regular rate and rhythm. No m/g/r  Respiratory: No respiratory distress. No accessory muscle use. Breath sounds equal and present bilaterally.    Abdomen: Abdomen is soft with no guarding or rigidity.  Normoactive bowel sounds.  No CVA tenderness bilaterally.  Left lower quadrant tenderness.     MDM  Workup initiated. Pt stable pending bed availability and further evaluation. Please see subsequent provider note for further details and disposition.       I evaluated this patient in triage with the RN. Due to the patients complaint labs and or imaging were ordered by myself in an attempt to expedite patient care however I am not participating in care after evaluation. This is a preliminary assessment. Pt does not appear in acute distress at this time. They will have a full evaluation as soon as possible. They will be cared for by another provider who will possibly order more labs, imaging or interventions. Pt did not have a full ROS or PE completed by myself however below is a summary with reasons for orders.  For the remainder of the patient's workup and ED course, please refer to the main ED provider note. We discussed need for diagnostic testing including laboratory studies and imaging.  We also  discussed that patient may be asked to wait in the waiting room while these tests are pending.  They understand that if they choose to leave without having the testing completed or resulted that we cannot rule out acute life threatening illnesses and the risks involved could lead to worsening condition, permanent disability or even death.

## 2025-03-31 ENCOUNTER — APPOINTMENT (OUTPATIENT)
Dept: OBSTETRICS AND GYNECOLOGY | Facility: CLINIC | Age: 35
End: 2025-03-31
Payer: COMMERCIAL

## 2025-03-31 VITALS
HEART RATE: 92 BPM | SYSTOLIC BLOOD PRESSURE: 122 MMHG | WEIGHT: 172 LBS | HEIGHT: 68 IN | BODY MASS INDEX: 26.07 KG/M2 | DIASTOLIC BLOOD PRESSURE: 82 MMHG

## 2025-03-31 DIAGNOSIS — Z30.42 ENCOUNTER FOR DEPO-PROVERA CONTRACEPTION: ICD-10-CM

## 2025-03-31 DIAGNOSIS — Z01.419 WELL WOMAN EXAM WITH ROUTINE GYNECOLOGICAL EXAM: Primary | ICD-10-CM

## 2025-03-31 DIAGNOSIS — M54.50 BILATERAL LOW BACK PAIN WITHOUT SCIATICA, UNSPECIFIED CHRONICITY: ICD-10-CM

## 2025-03-31 DIAGNOSIS — N39.0 URINARY TRACT INFECTION WITHOUT HEMATURIA, SITE UNSPECIFIED: ICD-10-CM

## 2025-03-31 DIAGNOSIS — N89.8 VAGINAL DISCHARGE: ICD-10-CM

## 2025-03-31 PROCEDURE — 1036F TOBACCO NON-USER: CPT | Performed by: NURSE PRACTITIONER

## 2025-03-31 PROCEDURE — 3008F BODY MASS INDEX DOCD: CPT | Performed by: NURSE PRACTITIONER

## 2025-03-31 PROCEDURE — 99395 PREV VISIT EST AGE 18-39: CPT | Performed by: NURSE PRACTITIONER

## 2025-03-31 PROCEDURE — 96372 THER/PROPH/DIAG INJ SC/IM: CPT | Performed by: NURSE PRACTITIONER

## 2025-03-31 RX ORDER — MEDROXYPROGESTERONE ACETATE 150 MG/ML
150 INJECTION, SUSPENSION INTRAMUSCULAR
Status: SHIPPED | OUTPATIENT
Start: 2025-03-31 | End: 2026-06-24

## 2025-03-31 RX ADMIN — MEDROXYPROGESTERONE ACETATE 150 MG: 150 INJECTION, SUSPENSION INTRAMUSCULAR at 10:35

## 2025-03-31 ASSESSMENT — ENCOUNTER SYMPTOMS: BACK PAIN: 1

## 2025-03-31 NOTE — PROGRESS NOTES
Althea Vernon, APRN-CNP     Subjective   Ravi Low is a 34 y.o. female who presents for annual exam.   Patient is here for an annual exam and her Depo-Provera injection.    Patient has some concerns over malodorous vaginal discharge and back pain.  In December she had a urinary tract infection would like to be checked again.  She has been sexually active since her last visit so we will check her for STDs as well.  There has not been a change in partner.  Past Medical History:   Diagnosis Date    Abdominal pain 2012    Acute cystitis 2023    Acute vaginitis     Bacterial vaginosis    Anogenital (venereal) warts 2016    Genital warts    Bacterial vaginosis 2020    Encounter for immunization 2018    Need for Tdap vaccination    Genital warts 2020    Hypokalemia 2012    Influenza 2024    Nausea and vomiting 2012    Nausea, vomiting, and diarrhea 2024    Nonspecific abdominal pain 2024    Other specified health status 2016    No pertinent past medical history    Pancreatitis ([facility]) 2012    Symptoms involving urinary system 2020    Trichomonas vaginitis 2024    Urinary tract infection 2024    Comment on above: STD CHECK POSS UTI      Vaginal irritation 2024    Vertigo 2024     Past Surgical History:   Procedure Laterality Date     SECTION, CLASSIC       Section     SECTION, LOW TRANSVERSE      WISDOM TOOTH EXTRACTION         OB History          2    Para   2    Term   2            AB        Living   2         SAB        IAB        Ectopic        Multiple        Live Births   2               No LMP recorded (lmp unknown). Patient has had an injection.      Review of Systems   Genitourinary:  Positive for vaginal discharge.   Musculoskeletal:  Positive for back pain.   All other systems reviewed and are negative.    Breast: No Complaints   Vaginal: Odor        Objective  "  /82   Pulse 92   Ht 1.727 m (5' 8\")   Wt 78 kg (172 lb)   LMP  (LMP Unknown)   BMI 26.15 kg/m²   Physical Exam  Constitutional:       Appearance: She is normal weight.   Genitourinary:      Urethral meatus normal.      Right Labia: No rash or tenderness.     Left Labia: No tenderness or rash.     Vaginal discharge and bleeding present.      No vaginal prolapse present.     No vaginal atrophy present.       Right Adnexa: no mass present.     Left Adnexa: no mass present.     No cervical motion tenderness.      Uterus is not enlarged or tender.   Breasts:     Right: Normal.      Left: Normal.   HENT:      Head: Normocephalic.   Pulmonary:      Effort: Pulmonary effort is normal.   Abdominal:      Palpations: Abdomen is soft.   Musculoskeletal:         General: Normal range of motion.      Cervical back: Normal range of motion.   Neurological:      Mental Status: She is alert.   Skin:     General: Skin is warm and dry.   Psychiatric:         Mood and Affect: Mood normal.                   Assessment/Plan   Problem List Items Addressed This Visit    None  Visit Diagnoses         Codes    Well woman exam with routine gynecological exam    -  Primary Z01.419    Relevant Orders    Vaginitis Gram Stain For Bacterial Vaginosis + Yeast    C. trachomatis / N. gonorrhoeae, Amplified, Urogenital    Trichomonas vaginalis, Amplified    Bilateral low back pain without sciatica, unspecified chronicity     M54.50    Relevant Orders    Urine Culture    Vaginal discharge     N89.8    Encounter for Depo-Provera contraception     Z30.42    Relevant Medications    medroxyPROGESTERone (Depo-Provera) injection 150 mg (Start on 3/31/2025 11:00 AM)          "

## 2025-04-01 RX ORDER — METRONIDAZOLE 500 MG/1
500 TABLET ORAL 2 TIMES DAILY
Qty: 14 TABLET | Refills: 0 | Status: SHIPPED | OUTPATIENT
Start: 2025-04-01 | End: 2025-04-08

## 2025-04-02 LAB
BACTERIA UR CULT: ABNORMAL
BV SCORE VAG QL: ABNORMAL
C TRACH RRNA SPEC QL NAA+PROBE: NOT DETECTED
N GONORRHOEA RRNA SPEC QL NAA+PROBE: NOT DETECTED
QUEST GC CT AMPLIFIED (ALWAYS MESSAGE): NORMAL
T VAGINALIS RRNA SPEC QL NAA+PROBE: NOT DETECTED

## 2025-04-03 ENCOUNTER — TELEPHONE (OUTPATIENT)
Dept: OBSTETRICS AND GYNECOLOGY | Facility: HOSPITAL | Age: 35
End: 2025-04-03
Payer: COMMERCIAL

## 2025-04-03 ENCOUNTER — PATIENT MESSAGE (OUTPATIENT)
Dept: OBSTETRICS AND GYNECOLOGY | Facility: HOSPITAL | Age: 35
End: 2025-04-03
Payer: COMMERCIAL

## 2025-04-03 RX ORDER — NITROFURANTOIN 25; 75 MG/1; MG/1
100 CAPSULE ORAL 2 TIMES DAILY
Qty: 14 CAPSULE | Refills: 0 | Status: SHIPPED | OUTPATIENT
Start: 2025-04-03 | End: 2025-04-10

## 2025-04-03 NOTE — TELEPHONE ENCOUNTER
----- Message from Althea Vernon sent at 4/3/2025  8:54 AM EDT -----  Please let patient know she tested positive for the urinary tract infection and antibiotics have been sent to the pharmacy.

## 2025-04-03 NOTE — TELEPHONE ENCOUNTER
----- Message from Althea Vernon sent at 4/1/2025  5:10 PM EDT -----  Please let patient know she tested positive for bacterial vaginosis.  Medication has been sent to the pharmacy

## 2025-04-03 NOTE — TELEPHONE ENCOUNTER
RN called to discuss lab results of BV. Left message to call the office. Sent mychart message.   LUIZ Hernandez    Patient read message.  LUIZ Hernandez

## 2025-04-03 NOTE — TELEPHONE ENCOUNTER
Patient was identified by name and date of birth.   Patient informed that she tested positive for UTI, which is a urinary tract infection.  Patient educated that she will need to be treated with an oral antibiotic twice daily for 7 days.  Pharmacy verified.  Patient verbalized understanding.    LUIZ Hernandez

## 2025-04-04 ENCOUNTER — APPOINTMENT (OUTPATIENT)
Dept: PRIMARY CARE | Facility: CLINIC | Age: 35
End: 2025-04-04
Payer: COMMERCIAL

## 2025-04-04 VITALS
WEIGHT: 172 LBS | HEART RATE: 88 BPM | HEIGHT: 68 IN | DIASTOLIC BLOOD PRESSURE: 79 MMHG | SYSTOLIC BLOOD PRESSURE: 133 MMHG | BODY MASS INDEX: 26.07 KG/M2 | OXYGEN SATURATION: 98 %

## 2025-04-04 DIAGNOSIS — F32.2 MDD (MAJOR DEPRESSIVE DISORDER), SEVERE (MULTI): Chronic | ICD-10-CM

## 2025-04-04 DIAGNOSIS — D50.9 IRON DEFICIENCY ANEMIA, UNSPECIFIED IRON DEFICIENCY ANEMIA TYPE: ICD-10-CM

## 2025-04-04 DIAGNOSIS — H61.21 IMPACTED CERUMEN OF RIGHT EAR: ICD-10-CM

## 2025-04-04 DIAGNOSIS — S39.012A STRAIN OF LUMBAR REGION, INITIAL ENCOUNTER: Primary | ICD-10-CM

## 2025-04-04 DIAGNOSIS — H66.001 NON-RECURRENT ACUTE SUPPURATIVE OTITIS MEDIA OF RIGHT EAR WITHOUT SPONTANEOUS RUPTURE OF TYMPANIC MEMBRANE: ICD-10-CM

## 2025-04-04 DIAGNOSIS — R44.8 FEELS COLD: ICD-10-CM

## 2025-04-04 DIAGNOSIS — E53.8 COBALAMIN DEFICIENCY: ICD-10-CM

## 2025-04-04 DIAGNOSIS — D57.3 SICKLE CELL TRAIT (CMS-HCC): ICD-10-CM

## 2025-04-04 PROBLEM — F33.1 MODERATE EPISODE OF RECURRENT MAJOR DEPRESSIVE DISORDER: Status: ACTIVE | Noted: 2024-08-13

## 2025-04-04 PROBLEM — F43.21 ADJUSTMENT DISORDER WITH DEPRESSED MOOD: Status: ACTIVE | Noted: 2024-08-08

## 2025-04-04 PROCEDURE — 1036F TOBACCO NON-USER: CPT

## 2025-04-04 PROCEDURE — 99214 OFFICE O/P EST MOD 30 MIN: CPT

## 2025-04-04 PROCEDURE — 3008F BODY MASS INDEX DOCD: CPT

## 2025-04-04 RX ORDER — METHYLPREDNISOLONE 4 MG/1
TABLET ORAL
Qty: 21 TABLET | Refills: 0 | Status: SHIPPED | OUTPATIENT
Start: 2025-04-04 | End: 2025-04-10

## 2025-04-04 RX ORDER — SERTRALINE HYDROCHLORIDE 50 MG/1
50 TABLET, FILM COATED ORAL DAILY
Qty: 90 TABLET | Refills: 1 | Status: SHIPPED | OUTPATIENT
Start: 2025-04-04

## 2025-04-04 RX ORDER — AMOXICILLIN AND CLAVULANATE POTASSIUM 875; 125 MG/1; MG/1
875 TABLET, FILM COATED ORAL 2 TIMES DAILY
Qty: 20 TABLET | Refills: 0 | Status: SHIPPED | OUTPATIENT
Start: 2025-04-04 | End: 2025-04-14

## 2025-04-04 ASSESSMENT — PATIENT HEALTH QUESTIONNAIRE - PHQ9
1. LITTLE INTEREST OR PLEASURE IN DOING THINGS: NOT AT ALL
2. FEELING DOWN, DEPRESSED OR HOPELESS: NOT AT ALL
SUM OF ALL RESPONSES TO PHQ9 QUESTIONS 1 AND 2: 0
1. LITTLE INTEREST OR PLEASURE IN DOING THINGS: NOT AT ALL
2. FEELING DOWN, DEPRESSED OR HOPELESS: NOT AT ALL
SUM OF ALL RESPONSES TO PHQ9 QUESTIONS 1 AND 2: 0

## 2025-04-04 ASSESSMENT — ENCOUNTER SYMPTOMS
DEPRESSION: 0
OCCASIONAL FEELINGS OF UNSTEADINESS: 0
LOSS OF SENSATION IN FEET: 0

## 2025-04-04 NOTE — PROGRESS NOTES
"Primary Care Provider: VIGNESH Noonan-CNP    Subjective   Ravi Low is a 34 y.o. female who presents for Follow-up (Right ear muffled, no pain water? /Back pain and headaches /Always cold).    HPI     Having bad low back pain  Midline and right sided low back pain  Standing and walking make pain worse; pain is constant though  No numbness, no tingling, no radiation of pain  Going on for 1-2 months now  PRN tylenol or ibuprofen for pain  No trouble with bowel  Having pain and frequency of urination  Dx with BV and UTI- she will start the medication today    Also has concerns of right ear pain and [place]  Having some headaches  No discharge from her ear    Sickle cell trait  Vit b 12 def hx  RAMON  Feels cold  Lab Results   Component Value Date    WBC 7.4 02/18/2025    HGB 12.7 02/18/2025    HCT 37.3 02/18/2025    MCV 81 02/18/2025     02/18/2025     Major depressive disorder, severe  Mood - feeling down; mother passed 2 years ago in feb.; no SI, no HI, no thoughts of self harm  She stopped her zoloft; would like her to get back on  Restart zoloft 50mg    Went to ER for nausea and vomiting 2/18/2025    Review of Systems  The remainder of the ROS was negative unless otherwise stated in the HPI.       Objective   /79   Pulse 88   Ht 1.727 m (5' 8\")   Wt 78 kg (172 lb)   LMP  (LMP Unknown)   SpO2 98%   BMI 26.15 kg/m²     Physical Exam  Vitals reviewed.   Constitutional:       General: She is not in acute distress.     Appearance: Normal appearance. She is normal weight. She is not ill-appearing, toxic-appearing or diaphoretic.   HENT:      Head: Normocephalic and atraumatic.      Nose: Nose normal.   Eyes:      Conjunctiva/sclera: Conjunctivae normal.   Cardiovascular:      Rate and Rhythm: Normal rate and regular rhythm.      Pulses: Normal pulses.      Heart sounds: Normal heart sounds. No murmur heard.     No friction rub. No gallop.   Pulmonary:      Effort: Pulmonary effort is normal. No " respiratory distress.      Breath sounds: Normal breath sounds.   Abdominal:      General: Abdomen is flat. Bowel sounds are normal.      Palpations: Abdomen is soft.      Tenderness: There is no right CVA tenderness or left CVA tenderness.   Musculoskeletal:      Cervical back: Normal range of motion and neck supple.      Lumbar back: Tenderness present. Positive right straight leg raise test.   Lymphadenopathy:      Cervical: No cervical adenopathy.   Skin:     General: Skin is warm and dry.   Neurological:      General: No focal deficit present.      Mental Status: She is alert and oriented to person, place, and time. Mental status is at baseline.   Psychiatric:         Mood and Affect: Mood normal.         Behavior: Behavior normal.         Thought Content: Thought content normal.         Judgment: Judgment normal.         Assessment/Plan   Problem List Items Addressed This Visit               ICD-10-CM    MDD (major depressive disorder), severe (Multi) (Chronic) F32.2    Persisting  Mood - feeling down; mother passed 2 years ago in feb.; no SI, no HI, no thoughts of self harm  She stopped her zoloft; would like her to get back on  Restart zoloft 50mg  Relevant Orders    Referral to Psychology    Cobalamin deficiency E53.8    Stable  Recheck labs  Relevant Orders    Vitamin B12    Iron deficiency anemia D50.9    Stable, but feeling cold; recheck labs, may need to restart ferrous sulfate  Relevant Orders    Iron and TIBC    Ferritin    CBC    Sickle cell trait (CMS-HCC) D57.3    Stable  Recheck labs  Relevant Orders    CBC     Other Visit Diagnoses         Codes    Strain of lumbar region, initial encounter    -  Primary S39.012A    Persisting  + right SLR; neg CVA tenderness  Consider PT and m. Relaxant if lumbar back pain is persisting  Relevant Medications    methylPREDNISolone (Medrol Dospak) 4 mg tablets    Impacted cerumen of right ear     H61.21    Unable to safely remove in office, remove not attempted-  will have her see ENT for removal instead  Relevant Orders    Referral to ENT    Non-recurrent acute suppurative otitis media of right ear without spontaneous rupture of tympanic membrane     H66.001    new  Relevant Medications    Start amoxicillin-pot clavulanate (Augmentin) 875-125 mg tablet    Other Relevant Orders    Referral to ENT    Feels cold     R44.8    Persisting  Recheck labs  Possible def  Relevant Orders    Iron and TIBC    Ferritin    Vitamin B12        Follow up if new, persisting, or worsening symptoms; otherwise follow up in 3 months

## 2025-04-05 LAB
ERYTHROCYTE [DISTWIDTH] IN BLOOD BY AUTOMATED COUNT: 13.9 % (ref 11–15)
FERRITIN SERPL-MCNC: 6 NG/ML (ref 16–154)
HCT VFR BLD AUTO: 38.1 % (ref 35–45)
HGB BLD-MCNC: 12.4 G/DL (ref 11.7–15.5)
IRON SATN MFR SERPL: 14 % (CALC) (ref 16–45)
IRON SERPL-MCNC: 53 MCG/DL (ref 40–190)
MCH RBC QN AUTO: 27.7 PG (ref 27–33)
MCHC RBC AUTO-ENTMCNC: 32.5 G/DL (ref 32–36)
MCV RBC AUTO: 85.2 FL (ref 80–100)
PLATELET # BLD AUTO: 350 THOUSAND/UL (ref 140–400)
PMV BLD REES-ECKER: 9.7 FL (ref 7.5–12.5)
RBC # BLD AUTO: 4.47 MILLION/UL (ref 3.8–5.1)
TIBC SERPL-MCNC: 373 MCG/DL (CALC) (ref 250–450)
VIT B12 SERPL-MCNC: 285 PG/ML (ref 200–1100)
WBC # BLD AUTO: 8.4 THOUSAND/UL (ref 3.8–10.8)

## 2025-04-07 DIAGNOSIS — E53.8 VITAMIN B12 DEFICIENCY: Primary | ICD-10-CM

## 2025-04-07 RX ORDER — LANOLIN ALCOHOL/MO/W.PET/CERES
1000 CREAM (GRAM) TOPICAL DAILY
Qty: 90 TABLET | Refills: 3 | Status: SHIPPED | OUTPATIENT
Start: 2025-04-07

## 2025-06-04 ENCOUNTER — PATIENT MESSAGE (OUTPATIENT)
Dept: PRIMARY CARE | Facility: CLINIC | Age: 35
End: 2025-06-04

## 2025-06-05 ENCOUNTER — APPOINTMENT (OUTPATIENT)
Dept: BEHAVIORAL HEALTH | Facility: CLINIC | Age: 35
End: 2025-06-05
Payer: COMMERCIAL

## 2025-06-06 ENCOUNTER — APPOINTMENT (OUTPATIENT)
Dept: PRIMARY CARE | Facility: CLINIC | Age: 35
End: 2025-06-06
Payer: COMMERCIAL

## 2025-06-15 ASSESSMENT — ENCOUNTER SYMPTOMS
BACK PAIN: 1
DIARRHEA: 0
HEADACHES: 1
NAUSEA: 0
ANOREXIA: 0
FREQUENCY: 1
VOMITING: 0
CONSTIPATION: 0
HEMATURIA: 0
CHILLS: 0
DYSURIA: 1
SORE THROAT: 0

## 2025-06-16 ENCOUNTER — APPOINTMENT (OUTPATIENT)
Dept: OBSTETRICS AND GYNECOLOGY | Facility: CLINIC | Age: 35
End: 2025-06-16
Payer: COMMERCIAL

## 2025-06-16 VITALS — WEIGHT: 173.4 LBS | SYSTOLIC BLOOD PRESSURE: 116 MMHG | BODY MASS INDEX: 26.37 KG/M2 | DIASTOLIC BLOOD PRESSURE: 75 MMHG

## 2025-06-16 DIAGNOSIS — Z30.42 ENCOUNTER FOR DEPO-PROVERA CONTRACEPTION: Primary | ICD-10-CM

## 2025-06-16 PROCEDURE — 96372 THER/PROPH/DIAG INJ SC/IM: CPT | Performed by: NURSE PRACTITIONER

## 2025-06-16 RX ORDER — MEDROXYPROGESTERONE ACETATE 150 MG/ML
150 INJECTION, SUSPENSION INTRAMUSCULAR ONCE
Status: COMPLETED | OUTPATIENT
Start: 2025-06-16 | End: 2025-06-16

## 2025-06-16 RX ADMIN — MEDROXYPROGESTERONE ACETATE 150 MG: 150 INJECTION, SUSPENSION INTRAMUSCULAR at 09:53

## 2025-06-16 ASSESSMENT — PAIN SCALES - GENERAL: PAINLEVEL_OUTOF10: 0-NO PAIN

## 2025-06-16 NOTE — PROGRESS NOTES
Ravi Low is being seen today for Depo-Provera. G(2)P(2)  Patient wishes to continue?Yes  Patient without side effects of None      Objective   /75   Wt 78.7 kg (173 lb 6.4 oz)   BMI 26.37 kg/m²     Depo Provera 150mg  IM givenRight GM    Return in 3months      Assessment/Plan   Problem List Items Addressed This Visit    None  Visit Diagnoses         Codes      Encounter for Depo-Provera contraception    -  Primary Z30.42    Relevant Medications    medroxyPROGESTERone (Depo-Provera) injection 150 mg (Start on 6/16/2025 10:15 AM)          RTC 3 months  APE 2/26

## 2025-06-17 ENCOUNTER — APPOINTMENT (OUTPATIENT)
Dept: PRIMARY CARE | Facility: CLINIC | Age: 35
End: 2025-06-17
Payer: COMMERCIAL

## 2025-06-17 VITALS
DIASTOLIC BLOOD PRESSURE: 85 MMHG | OXYGEN SATURATION: 97 % | HEIGHT: 68 IN | SYSTOLIC BLOOD PRESSURE: 124 MMHG | BODY MASS INDEX: 25.76 KG/M2 | WEIGHT: 170 LBS | HEART RATE: 96 BPM

## 2025-06-17 DIAGNOSIS — H65.04 RECURRENT ACUTE SEROUS OTITIS MEDIA OF RIGHT EAR: ICD-10-CM

## 2025-06-17 DIAGNOSIS — R35.0 URINARY FREQUENCY: ICD-10-CM

## 2025-06-17 DIAGNOSIS — N39.0 URINARY TRACT INFECTION WITHOUT HEMATURIA, SITE UNSPECIFIED: Primary | ICD-10-CM

## 2025-06-17 DIAGNOSIS — Z11.3 SCREENING FOR STD (SEXUALLY TRANSMITTED DISEASE): ICD-10-CM

## 2025-06-17 DIAGNOSIS — N76.0 ACUTE VAGINITIS: ICD-10-CM

## 2025-06-17 DIAGNOSIS — H61.21 IMPACTED CERUMEN OF RIGHT EAR: ICD-10-CM

## 2025-06-17 DIAGNOSIS — R30.0 DYSURIA: ICD-10-CM

## 2025-06-17 DIAGNOSIS — N89.8 VAGINAL ODOR: ICD-10-CM

## 2025-06-17 LAB
POC APPEARANCE, URINE: CLEAR
POC BILIRUBIN, URINE: NEGATIVE
POC BLOOD, URINE: NEGATIVE
POC COLOR, URINE: YELLOW
POC GLUCOSE, URINE: NEGATIVE MG/DL
POC KETONES, URINE: NEGATIVE MG/DL
POC LEUKOCYTES, URINE: ABNORMAL
POC NITRITE,URINE: NEGATIVE
POC PH, URINE: 5.5 PH
POC PROTEIN, URINE: NEGATIVE MG/DL
POC SPECIFIC GRAVITY, URINE: 1.01
POC UROBILINOGEN, URINE: 0.2 EU/DL
PREGNANCY TEST URINE, POC: NEGATIVE

## 2025-06-17 PROCEDURE — 99213 OFFICE O/P EST LOW 20 MIN: CPT

## 2025-06-17 PROCEDURE — 81003 URINALYSIS AUTO W/O SCOPE: CPT

## 2025-06-17 PROCEDURE — 3008F BODY MASS INDEX DOCD: CPT

## 2025-06-17 PROCEDURE — 81025 URINE PREGNANCY TEST: CPT

## 2025-06-17 PROCEDURE — 1036F TOBACCO NON-USER: CPT

## 2025-06-17 PROCEDURE — 69210 REMOVE IMPACTED EAR WAX UNI: CPT

## 2025-06-17 RX ORDER — AMOXICILLIN AND CLAVULANATE POTASSIUM 875; 125 MG/1; MG/1
875 TABLET, FILM COATED ORAL 2 TIMES DAILY
Qty: 20 TABLET | Refills: 0 | Status: SHIPPED | OUTPATIENT
Start: 2025-06-17 | End: 2025-06-27

## 2025-06-17 ASSESSMENT — ENCOUNTER SYMPTOMS
FREQUENCY: 1
DYSURIA: 1
BACK PAIN: 1
CHILLS: 0
SORE THROAT: 0
OCCASIONAL FEELINGS OF UNSTEADINESS: 0
HEADACHES: 1
CONSTIPATION: 0
HEMATURIA: 0
LOSS OF SENSATION IN FEET: 0
DIARRHEA: 0
NAUSEA: 0
DEPRESSION: 0
VOMITING: 0
ANOREXIA: 0

## 2025-06-17 NOTE — PROGRESS NOTES
Primary Care Provider: FLORIDA Noonan    Subjective   Ravi Low is a 34 y.o. female who presents for Follow-up (UTI /BV/Coming frequently ).    Female  Problem  The patient's primary symptoms include a genital odor and pelvic pain. The patient's pertinent negatives include no genital itching, genital lesions, genital rash, missed menses, vaginal bleeding or vaginal discharge. This is a recurrent problem. The current episode started in the past 7 days. The problem occurs intermittently. The problem has been unchanged. The pain is moderate. She is not pregnant. Associated symptoms include back pain, dysuria, frequency, headaches and urgency. Pertinent negatives include no anorexia, chills, constipation, diarrhea, discolored urine, hematuria, joint pain, joint swelling, nausea, painful intercourse, rash, sore throat or vomiting. The symptoms are aggravated by urinating. She is sexually active. No, her partner does not have an STD. She uses progestin injections for contraception. Her menstrual history has been irregular.          Chief Complaint: Female genitourinary complaint  Burning and discomfort with urination & odorous urine  Going on for 2 weeks now  genital itching: yes  genital lesions: No  genital odor: Yes  genital rash: No  missed menses: No  pelvic pain: Yes  vaginal bleeding: No  vaginal discharge: No  Progression since onset: unchanged  Pain severity: moderate  Pregnant now?: No  anorexia: No  back pain: Yes  chills: No  constipation: No  diarrhea: No  discolored urine: No  dysuria: Yes  frequency: Yes  headaches: Yes  hematuria: No  joint pain: No  joint swelling: No  nausea: No  painful intercourse: No  rash: No  sore throat: No  urgency: Yes  vomiting: No  Aggravated by: urinating  Sexual activity: sexually active  Partner with STD symptoms: no  Birth control: progestin injections  Menstrual history: irregular    Left hand with thumb pain and tingling    Right ear pain, pain behind  "ear, muffled hearing    Review of Systems   Constitutional:  Negative for chills.   HENT:  Negative for sore throat.    Gastrointestinal:  Negative for anorexia, constipation, diarrhea, nausea and vomiting.   Genitourinary:  Positive for dysuria, frequency, pelvic pain and urgency. Negative for hematuria, missed menses and vaginal discharge.   Musculoskeletal:  Positive for back pain. Negative for joint pain.   Skin:  Negative for rash.   Neurological:  Positive for headaches.       The remainder of the ROS was negative unless otherwise stated in the HPI.     Objective   /85   Pulse 96   Ht 1.727 m (5' 8\")   Wt 77.1 kg (170 lb)   SpO2 97%   BMI 25.85 kg/m²     Physical Exam  Vitals reviewed.   Constitutional:       General: She is not in acute distress.     Appearance: Normal appearance. She is normal weight. She is not ill-appearing, toxic-appearing or diaphoretic.   HENT:      Head: Normocephalic and atraumatic.      Right Ear: Decreased hearing noted. Tenderness present. A middle ear effusion is present. There is impacted cerumen. Tympanic membrane is erythematous.   Eyes:      Conjunctiva/sclera: Conjunctivae normal.   Cardiovascular:      Rate and Rhythm: Normal rate and regular rhythm.      Pulses: Normal pulses.      Heart sounds: Normal heart sounds. No murmur heard.     No friction rub. No gallop.   Pulmonary:      Effort: Pulmonary effort is normal. No respiratory distress.      Breath sounds: Normal breath sounds.   Abdominal:      General: Abdomen is flat. Bowel sounds are normal.      Palpations: Abdomen is soft.   Musculoskeletal:         General: Normal range of motion.   Skin:     General: Skin is warm and dry.   Neurological:      General: No focal deficit present.      Mental Status: She is alert and oriented to person, place, and time. Mental status is at baseline.   Psychiatric:         Mood and Affect: Mood normal.         Behavior: Behavior normal.         Thought Content: Thought " content normal.         Judgment: Judgment normal.         Assessment/Plan   Problem List Items Addressed This Visit           ICD-10-CM    Urinary frequency R35.0    New  Concern for UTI; check for yeast infection as well as BV  Negative testing for IO pregnancy  Relevant Orders    Urine Culture    POCT Pregnancy, Urine manually resulted (Completed)     Other Visit Diagnoses         Codes      Urinary tract infection without hematuria, site unspecified    -  Primary N39.0    New  Concern for UTI; check for yeast infection as well as BV  Negative testing for IO pregnancy  URINE CULTURE PENDING  Relevant Orders    POCT UA Automated manually resulted (Completed)      Dysuria     R30.0    New  Concern for UTI; check for yeast infection as well as BV  Negative testing for IO pregnancy  Relevant Orders    Urine Culture      Screening for STD (sexually transmitted disease)     Z11.3    New  Concern for UTI; check for yeast infection as well as BV  Negative testing for IO pregnancy  Relevant Orders    Vaginitis Gram Stain For Bacterial Vaginosis + Yeast      Acute vaginitis     N76.0    New  Concern for UTI; check for yeast infection as well as BV  Negative testing for IO pregnancy  Relevant Orders    Vaginitis Gram Stain For Bacterial Vaginosis + Yeast      Vaginal odor     N89.8    New  Concern for UTI; check for yeast infection as well as BV  Negative testing for IO pregnancy  Relevant Orders    Vaginitis Gram Stain For Bacterial Vaginosis + Yeast      Impacted cerumen of right ear     Resolved; flushed without difficulty  H61.21      Recurrent acute serous otitis media of right ear     H65.04    Reoccurring   Relevant Medications    amoxicillin-clavulanate (Augmentin) 875-125 mg tablet        Follow up in 3 months or sooner if needed

## 2025-06-18 LAB — BV SCORE VAG QL: NORMAL

## 2025-06-19 LAB — BACTERIA UR CULT: ABNORMAL

## 2025-07-11 ENCOUNTER — APPOINTMENT (OUTPATIENT)
Dept: PRIMARY CARE | Facility: CLINIC | Age: 35
End: 2025-07-11
Payer: COMMERCIAL

## 2025-09-11 ENCOUNTER — APPOINTMENT (OUTPATIENT)
Dept: OBSTETRICS AND GYNECOLOGY | Facility: CLINIC | Age: 35
End: 2025-09-11
Payer: COMMERCIAL

## 2026-04-02 ENCOUNTER — APPOINTMENT (OUTPATIENT)
Dept: OBSTETRICS AND GYNECOLOGY | Facility: CLINIC | Age: 36
End: 2026-04-02
Payer: COMMERCIAL